# Patient Record
Sex: FEMALE | Race: ASIAN | NOT HISPANIC OR LATINO | Employment: PART TIME | URBAN - METROPOLITAN AREA
[De-identification: names, ages, dates, MRNs, and addresses within clinical notes are randomized per-mention and may not be internally consistent; named-entity substitution may affect disease eponyms.]

---
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
## 2021-01-05 RX ORDER — LORAZEPAM 1 MG/1
TABLET ORAL
Qty: 1 TABLET | Refills: 0 | Status: SHIPPED | OUTPATIENT
Start: 2021-01-05

## 2021-01-05 NOTE — TELEPHONE ENCOUNTER
Left message for pts mother relaying that medication has been sent as requested. Provided direct contact information should she have any questions or concerns. Stephanie SIU RN, BSN

## 2021-01-18 ENCOUNTER — ANCILLARY PROCEDURE (OUTPATIENT)
Dept: MRI IMAGING | Facility: CLINIC | Age: 21
End: 2021-01-18
Attending: PLASTIC SURGERY
Payer: COMMERCIAL

## 2021-01-18 DIAGNOSIS — Q51.9 CONGENITAL ANOMALY OF CERVIX, VAGINA, AND EXTERNAL FEMALE GENITALIA: ICD-10-CM

## 2021-01-18 DIAGNOSIS — N89.5 VAGINAL STENOSIS: ICD-10-CM

## 2021-01-18 DIAGNOSIS — Q52.9 CONGENITAL ANOMALY OF CERVIX, VAGINA, AND EXTERNAL FEMALE GENITALIA: ICD-10-CM

## 2021-01-18 DIAGNOSIS — Q52.4 CONGENITAL ANOMALY OF CERVIX, VAGINA, AND EXTERNAL FEMALE GENITALIA: ICD-10-CM

## 2021-01-18 PROCEDURE — 72195 MRI PELVIS W/O DYE: CPT | Mod: 52 | Performed by: RADIOLOGY

## 2021-01-18 RX ORDER — GADOBUTROL 604.72 MG/ML
7.5 INJECTION INTRAVENOUS ONCE
Status: ACTIVE | OUTPATIENT
Start: 2021-01-18

## 2021-03-29 ENCOUNTER — PATIENT OUTREACH (OUTPATIENT)
Dept: PLASTIC SURGERY | Facility: CLINIC | Age: 21
End: 2021-03-29

## 2021-03-29 NOTE — PATIENT INSTRUCTIONS
Left message for pt regarding follow up plan. Explained that new MRI is not needed. Based on results there is no urgency to the revision. We recommend continuing with smoking cessation and better diabetes control, goal of A1C less than 7. Explained that Dr. Shaw is leaving the practice, but that pt should follow up with Dr. Weldon once above is completed. Provided direct contact information should she have any questions or concerns. Stephanie SIU RN, BSN

## 2021-06-22 ENCOUNTER — HOSPITAL ENCOUNTER (INPATIENT)
Facility: CLINIC | Age: 21
LOS: 2 days | Discharge: HOME | DRG: 885 | End: 2021-06-25
Attending: EMERGENCY MEDICINE | Admitting: PSYCHIATRY & NEUROLOGY
Payer: COMMERCIAL

## 2021-06-22 DIAGNOSIS — F12.99: ICD-10-CM

## 2021-06-22 DIAGNOSIS — F31.9 BIPOLAR AFFECTIVE DISORDER, REMISSION STATUS UNSPECIFIED (H): ICD-10-CM

## 2021-06-22 DIAGNOSIS — F19.10 POLYSUBSTANCE ABUSE (H): ICD-10-CM

## 2021-06-22 DIAGNOSIS — Z20.822 CONTACT WITH AND (SUSPECTED) EXPOSURE TO COVID-19: ICD-10-CM

## 2021-06-22 DIAGNOSIS — R40.0 SOMNOLENCE: ICD-10-CM

## 2021-06-22 LAB — GLUCOSE BLDC GLUCOMTR-MCNC: 288 MG/DL (ref 70–99)

## 2021-06-22 PROCEDURE — 99285 EMERGENCY DEPT VISIT HI MDM: CPT | Mod: 25 | Performed by: EMERGENCY MEDICINE

## 2021-06-22 PROCEDURE — 99285 EMERGENCY DEPT VISIT HI MDM: CPT | Performed by: EMERGENCY MEDICINE

## 2021-06-22 PROCEDURE — 250N000013 HC RX MED GY IP 250 OP 250 PS 637: Performed by: EMERGENCY MEDICINE

## 2021-06-22 PROCEDURE — 250N000009 HC RX 250: Performed by: EMERGENCY MEDICINE

## 2021-06-22 PROCEDURE — 90791 PSYCH DIAGNOSTIC EVALUATION: CPT

## 2021-06-22 PROCEDURE — 999N001017 HC STATISTIC GLUCOSE BY METER IP

## 2021-06-22 RX ORDER — OLANZAPINE 10 MG/1
10 TABLET, ORALLY DISINTEGRATING ORAL
Status: DISCONTINUED | OUTPATIENT
Start: 2021-06-22 | End: 2021-06-23

## 2021-06-22 RX ORDER — LIRAGLUTIDE 6 MG/ML
1.2 INJECTION SUBCUTANEOUS AT BEDTIME
Status: DISCONTINUED | OUTPATIENT
Start: 2021-06-22 | End: 2021-06-23

## 2021-06-22 RX ADMIN — INSULIN DEGLUDEC INJECTION 25 UNITS: 100 INJECTION, SOLUTION SUBCUTANEOUS at 23:58

## 2021-06-22 RX ADMIN — LIRAGLUTIDE 1.2 MG: 6 INJECTION SUBCUTANEOUS at 23:58

## 2021-06-23 PROBLEM — R40.0 SOMNOLENCE: Status: ACTIVE | Noted: 2021-06-23

## 2021-06-23 PROBLEM — F19.10 POLYSUBSTANCE ABUSE (H): Status: ACTIVE | Noted: 2021-06-23

## 2021-06-23 LAB
AMPHETAMINES UR QL SCN: NEGATIVE
BARBITURATES UR QL: NEGATIVE
BENZODIAZ UR QL: NEGATIVE
CANNABINOIDS UR QL SCN: NEGATIVE
COCAINE UR QL: NEGATIVE
ETHANOL UR QL SCN: NEGATIVE
GLUCOSE BLDC GLUCOMTR-MCNC: 212 MG/DL (ref 70–99)
GLUCOSE BLDC GLUCOMTR-MCNC: 226 MG/DL (ref 70–99)
GLUCOSE BLDC GLUCOMTR-MCNC: 268 MG/DL (ref 70–99)
GLUCOSE BLDC GLUCOMTR-MCNC: 271 MG/DL (ref 70–99)
GLUCOSE BLDC GLUCOMTR-MCNC: 306 MG/DL (ref 70–99)
HBA1C MFR BLD: 8.3 % (ref 0–5.6)
HCG UR QL: NEGATIVE
LABORATORY COMMENT REPORT: NORMAL
OPIATES UR QL SCN: NEGATIVE
SARS-COV-2 RNA RESP QL NAA+PROBE: NEGATIVE
SPECIMEN SOURCE: NORMAL

## 2021-06-23 PROCEDURE — 124N000002 HC R&B MH UMMC

## 2021-06-23 PROCEDURE — 250N000013 HC RX MED GY IP 250 OP 250 PS 637: Performed by: STUDENT IN AN ORGANIZED HEALTH CARE EDUCATION/TRAINING PROGRAM

## 2021-06-23 PROCEDURE — 93010 ELECTROCARDIOGRAM REPORT: CPT | Performed by: INTERNAL MEDICINE

## 2021-06-23 PROCEDURE — 99221 1ST HOSP IP/OBS SF/LOW 40: CPT | Performed by: PHYSICIAN ASSISTANT

## 2021-06-23 PROCEDURE — 250N000009 HC RX 250: Performed by: STUDENT IN AN ORGANIZED HEALTH CARE EDUCATION/TRAINING PROGRAM

## 2021-06-23 PROCEDURE — 99207 PR CONSULT E&M CHANGED TO INITIAL LEVEL: CPT | Performed by: PHYSICIAN ASSISTANT

## 2021-06-23 PROCEDURE — 93005 ELECTROCARDIOGRAM TRACING: CPT

## 2021-06-23 PROCEDURE — 80307 DRUG TEST PRSMV CHEM ANLYZR: CPT | Performed by: EMERGENCY MEDICINE

## 2021-06-23 PROCEDURE — 999N001017 HC STATISTIC GLUCOSE BY METER IP

## 2021-06-23 PROCEDURE — 87635 SARS-COV-2 COVID-19 AMP PRB: CPT | Performed by: EMERGENCY MEDICINE

## 2021-06-23 PROCEDURE — 250N000012 HC RX MED GY IP 250 OP 636 PS 637: Performed by: STUDENT IN AN ORGANIZED HEALTH CARE EDUCATION/TRAINING PROGRAM

## 2021-06-23 PROCEDURE — 80320 DRUG SCREEN QUANTALCOHOLS: CPT | Performed by: EMERGENCY MEDICINE

## 2021-06-23 PROCEDURE — 83036 HEMOGLOBIN GLYCOSYLATED A1C: CPT | Performed by: STUDENT IN AN ORGANIZED HEALTH CARE EDUCATION/TRAINING PROGRAM

## 2021-06-23 PROCEDURE — 36415 COLL VENOUS BLD VENIPUNCTURE: CPT | Performed by: STUDENT IN AN ORGANIZED HEALTH CARE EDUCATION/TRAINING PROGRAM

## 2021-06-23 PROCEDURE — 99223 1ST HOSP IP/OBS HIGH 75: CPT | Mod: AI | Performed by: PSYCHIATRY & NEUROLOGY

## 2021-06-23 PROCEDURE — 81025 URINE PREGNANCY TEST: CPT | Performed by: EMERGENCY MEDICINE

## 2021-06-23 RX ORDER — POLYETHYLENE GLYCOL 3350 17 G/17G
17 POWDER, FOR SOLUTION ORAL DAILY PRN
Status: DISCONTINUED | OUTPATIENT
Start: 2021-06-23 | End: 2021-06-25 | Stop reason: HOSPADM

## 2021-06-23 RX ORDER — NICOTINE POLACRILEX 4 MG
15-30 LOZENGE BUCCAL
Status: DISCONTINUED | OUTPATIENT
Start: 2021-06-23 | End: 2021-06-25 | Stop reason: HOSPADM

## 2021-06-23 RX ORDER — OLANZAPINE 10 MG/2ML
10 INJECTION, POWDER, FOR SOLUTION INTRAMUSCULAR 3 TIMES DAILY PRN
Status: DISCONTINUED | OUTPATIENT
Start: 2021-06-23 | End: 2021-06-25 | Stop reason: HOSPADM

## 2021-06-23 RX ORDER — MAGNESIUM HYDROXIDE/ALUMINUM HYDROXICE/SIMETHICONE 120; 1200; 1200 MG/30ML; MG/30ML; MG/30ML
30 SUSPENSION ORAL EVERY 4 HOURS PRN
Status: DISCONTINUED | OUTPATIENT
Start: 2021-06-23 | End: 2021-06-25 | Stop reason: HOSPADM

## 2021-06-23 RX ORDER — ATOMOXETINE 40 MG/1
40 CAPSULE ORAL DAILY
Status: DISCONTINUED | OUTPATIENT
Start: 2021-06-23 | End: 2021-06-25 | Stop reason: HOSPADM

## 2021-06-23 RX ORDER — LIRAGLUTIDE 6 MG/ML
1.2 INJECTION SUBCUTANEOUS DAILY
Status: DISCONTINUED | OUTPATIENT
Start: 2021-06-23 | End: 2021-06-25 | Stop reason: HOSPADM

## 2021-06-23 RX ORDER — LISINOPRIL 20 MG/1
20 TABLET ORAL DAILY
Status: DISCONTINUED | OUTPATIENT
Start: 2021-06-23 | End: 2021-06-25 | Stop reason: HOSPADM

## 2021-06-23 RX ORDER — GABAPENTIN 100 MG/1
200 CAPSULE ORAL 2 TIMES DAILY PRN
COMMUNITY
Start: 2021-06-17

## 2021-06-23 RX ORDER — ESTRADIOL 2 MG/1
4 TABLET ORAL DAILY
Status: DISCONTINUED | OUTPATIENT
Start: 2021-06-23 | End: 2021-06-25 | Stop reason: HOSPADM

## 2021-06-23 RX ORDER — ACETAMINOPHEN 325 MG/1
650 TABLET ORAL EVERY 4 HOURS PRN
Status: DISCONTINUED | OUTPATIENT
Start: 2021-06-23 | End: 2021-06-25 | Stop reason: HOSPADM

## 2021-06-23 RX ORDER — ATOMOXETINE 40 MG/1
40 CAPSULE ORAL DAILY PRN
COMMUNITY
Start: 2021-06-15

## 2021-06-23 RX ORDER — OLANZAPINE 10 MG/1
10 TABLET ORAL EVERY EVENING
COMMUNITY
Start: 2021-06-07

## 2021-06-23 RX ORDER — LANOLIN ALCOHOL/MO/W.PET/CERES
3 CREAM (GRAM) TOPICAL
Status: DISCONTINUED | OUTPATIENT
Start: 2021-06-23 | End: 2021-06-25 | Stop reason: HOSPADM

## 2021-06-23 RX ORDER — OLANZAPINE 10 MG/1
10 TABLET ORAL EVERY EVENING
Status: DISCONTINUED | OUTPATIENT
Start: 2021-06-23 | End: 2021-06-25 | Stop reason: HOSPADM

## 2021-06-23 RX ORDER — OLANZAPINE 10 MG/1
10 TABLET ORAL 3 TIMES DAILY PRN
Status: DISCONTINUED | OUTPATIENT
Start: 2021-06-23 | End: 2021-06-25 | Stop reason: HOSPADM

## 2021-06-23 RX ORDER — DEXTROSE MONOHYDRATE 25 G/50ML
25-50 INJECTION, SOLUTION INTRAVENOUS
Status: DISCONTINUED | OUTPATIENT
Start: 2021-06-23 | End: 2021-06-25 | Stop reason: HOSPADM

## 2021-06-23 RX ORDER — GABAPENTIN 100 MG/1
200 CAPSULE ORAL 2 TIMES DAILY PRN
Status: DISCONTINUED | OUTPATIENT
Start: 2021-06-23 | End: 2021-06-25 | Stop reason: HOSPADM

## 2021-06-23 RX ORDER — ERGOCALCIFEROL 1.25 MG/1
50000 CAPSULE, LIQUID FILLED ORAL DAILY
Status: DISCONTINUED | OUTPATIENT
Start: 2021-06-23 | End: 2021-06-25 | Stop reason: HOSPADM

## 2021-06-23 RX ADMIN — LIRAGLUTIDE 1.2 MG: 6 INJECTION SUBCUTANEOUS at 09:06

## 2021-06-23 RX ADMIN — OLANZAPINE 10 MG: 10 TABLET, FILM COATED ORAL at 20:24

## 2021-06-23 RX ADMIN — ESTRADIOL 4 MG: 2 TABLET ORAL at 09:04

## 2021-06-23 RX ADMIN — INSULIN DEGLUDEC INJECTION 27 UNITS: 100 INJECTION, SOLUTION SUBCUTANEOUS at 20:42

## 2021-06-23 RX ADMIN — INSULIN ASPART 2 UNITS: 100 INJECTION, SOLUTION INTRAVENOUS; SUBCUTANEOUS at 02:39

## 2021-06-23 RX ADMIN — LISINOPRIL 20 MG: 20 TABLET ORAL at 09:05

## 2021-06-23 RX ADMIN — NICOTINE 7 MG/24 HR DAILY TRANSDERMAL PATCH 1 PATCH: at 13:55

## 2021-06-23 RX ADMIN — MELATONIN TAB 3 MG 3 MG: 3 TAB at 20:24

## 2021-06-23 RX ADMIN — INSULIN ASPART 2 UNITS: 100 INJECTION, SOLUTION INTRAVENOUS; SUBCUTANEOUS at 09:05

## 2021-06-23 RX ADMIN — ATOMOXETINE 40 MG: 40 CAPSULE ORAL at 09:03

## 2021-06-23 RX ADMIN — NICOTINE POLACRILEX 4 MG: 2 GUM, CHEWING BUCCAL at 18:45

## 2021-06-23 RX ADMIN — FLUOXETINE 40 MG: 20 CAPSULE ORAL at 09:04

## 2021-06-23 RX ADMIN — ERGOCALCIFEROL 50000 UNITS: 1.25 CAPSULE, LIQUID FILLED ORAL at 09:04

## 2021-06-23 ASSESSMENT — MIFFLIN-ST. JEOR: SCORE: 1557.41

## 2021-06-23 ASSESSMENT — ACTIVITIES OF DAILY LIVING (ADL)
LAUNDRY: UNABLE TO COMPLETE
DRESS: SCRUBS (BEHAVIORAL HEALTH)
ORAL_HYGIENE: INDEPENDENT
HYGIENE/GROOMING: INDEPENDENT

## 2021-06-23 NOTE — PLAN OF CARE
BEHAVIORAL TEAM DISCUSSION    Participants: Dr. Gonzalez, Psychiatry Resident, Melania IBRAHIM, Kindra Laboy [facility]  Progress: New Patient  Anticipated length of stay: No anticipated discharge date at this time.   Continued Stay Criteria/Rationale: Patient is manic. Needs clinical stabilization and medication management.   Medical/Physical: See H&P note  Precautions:   Behavioral Orders   Procedures     Code 1 - Restrict to Unit     Routine Programming     As clinically indicated     Status 15     Every 15 minutes.     Plan: Patient will have ongoing psychiatric assessment. Medications will be reviewed and adjusted per MD as indicated. Outpatient providers will be contacted for care coordination. CTC will continue to assess needs and  ensure appropriate follow up care is in place.  Rationale for change in precautions or plan: None

## 2021-06-23 NOTE — PROGRESS NOTES
Yamilka Tierney Read  June 23, 2021    The patient is brought to the ER by her mother for continued acute merissa and psychosis symptoms. The patient is delusional and believes she can taste colors, the neighbor's car is calling her, and that she should move to [place]. She needs redirection for basic tasks like eating, tending to her blood sugar, brushing her teeth and showering. The patient is not fully oriented and becomes minimally functional only when her mother administers her Zyprexa.     Current Suicidal Ideation/Self-Injurious Concerns/Methods: None    Inappropriate Sexual Behavior: No    Aggression/Homicidal Ideation: None - N/A    For additional details see full DEC assessment.     Mireya Donaldson, LICSW

## 2021-06-23 NOTE — H&P
"    ----------------------------------------------------------------------------------------------------------  [facility]  History and Physical  Yamilka Franklin MRN# [id number]   Age: 21 year old YOB: 2000   Date of Admission:  6/22/2021  (information obtained from patient interview and chart review)    Contacts:   Primary Outpatient Psychiatrist: Dr, Johnson, [facility], 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     Per ED Note:  Yamilka Franklin is a 21 year old female who presents to the emergency department for psychiatric evaluation. The history is obtained from the medical record and the patient's mother, Mandy.  Mackenzie is uanble to provide additional history as she is sedated with Zyprexa.   Mackenzie was recently  hospitalized at [facility] from 5/31-6/7/2021 for a suspected  substance-induced mood disorder.  She reported using K2 and marijuana and was experiencing auditory hallucinations and hyperexcitability.  At that time she was agitated and had to be placed on a 72-hour hold and treated with Zyprexa for aggitaiton.   She was discharged home with a supply of her medications, including Zyprexa which she was initiated while inpatient.   Her Adderall and Restoril were discontinued.  They noted that a substantial portion of her symptoms seem to be substance induced.  Her mother states that since returning home, " "she has had continued symptoms of agitation, irregular sleep, and delusional thinking and responding to internal stimuli.  Her mother states that she used marijuana and possibly K2 after returning home. She will often make delusional statements and has been found confused on the streets.  Her mother is concerned for her safety. \"      Per DEC Assessment:  \" The patient is a 21-year-old  female brought to the ER by her mother for continued acute merissa and psychosis symptoms.  Historically the patient has been high functioning, living independently and working at a job at [employer].  In the past year she has been using marijuana for the first time which her mother Tricia ([phone]) believes triggered her first episode of merissa and psychosis.  The patient was adopted at the age of [age] from an orphanage in [facility] so she has some history of trauma and attachment issues.  Patient has type 2 diabetes, it is notable for hospital staff to be aware \"she is really disorganized around food but gets really anxious about food.  She should not have carbs restricted because she can manage it with insulin and when she gets fearful or hungry she gets extremely upset.\"  Mandy states that she brought her daughter to the hospital today because she is so disorganized, no longer is managing her diabetes, eating regularly or taking showers, and has tried to go to work 3 times this week before coming home prematurely due to her any inability to function.     The patient presented somewhat lethargic during the assessment but Mandy states that because she was recently given her Zyprexa and \"she has been antsy all day.\"  The patient states \"I think I would be seeing stuff that only I can see sometimes.  It is like my brain is foggy but I remember bits and pieces that are floating in my mind.  Sometimes I feel like I can hear colors and feel the colors like a mood ring.  I think I might hurt somebody but not on purpose, like accidentally, " "he had just incidentally I might say something or do something that might be disrespectful or rude.\"  The patient's mother states that Mackenzie is delusional and believes \"the [employer] car is calling\"  her and that she should moved to [place].  She says she might be a dog or an alien.  A month ago she went missing for 12 hours because she just goes walking in circles.  Mandy states that she needs redirection for basic tasks like eating, tending to her blood sugar, brushing her teeth and showering.  The patient is not fully oriented and becomes minimally functional only when her mother administers her Zyprexa.  The patient was recently hospitalized at [facility] for 6 days but had a resurgence of symptoms again shortly after.  She has history of ADHD, VALERIE, DMDD and unspecified mood disorder and possibly \" bipolar\" according to the mother.  Mother adds \"my son has an eating disorder and the amount of time and attention he was getting was upsetting to her.  So she started using weed.\"  She states that her daughter was previously taking ADHD medication but then began selling it so it was taken away.  During the assessment the patient states \"I had sex for the first time, it was anal, it was consensual, it was with Martin, he has this mena who I was talking for a long time.  It was very consensual.  He is a bit older than me but I do not mind.\"  She is agreeable to voluntary admission to the hospital.\"    Per Patient Interview/Colletral from Mandy (Mom):  Mackenzie was interviewed in her room. She stated that her mom was concerned about her symptoms of \"merissa\" and called the EMS. \"My mom thinks I have bipolar disorder, I think I have it too.\" She had slept only an hour or so three nights on a row past week and \"my mind did not feel tired but my body did the next morning.\" She disclosed that she has been using cannabis, K2 at least three times a week, and last use was last Thursday. She has been struggling with depression since the age of 15 " and have been on fluoxetine and Cymbalta for a long time. Her Cymbalta dose was increased last year during the pandemic when her depression gotten worse; she had difficulty to concentrate and follow through her online classes and felt isolated. Spoke with mom on the phone. She stated that Mackenzie has been irritable, restless, she has been changing her clothes repetitively, walking in circles at home and engaging in high risk sexual behavior. Mackenzie was hospitalized at [facility] at the end May for similar sx and was discharged with olanzapine 10mg at bed time.     She was medically cleared for admission to inpatient psychiatric unit. The risks, benefits, alternatives, and side effects of treatments including no treatment have been discussed and are understood by the patient and other caregivers.    Psychiatric ROS:  Depression: poor concentration /memory, hx of depressive episodes  Lissette/Hypomania:  increased energy, decreased sleep need, increased activity, grandiosity, distractibility  and excessive risk taking  Anxiety: None reported  Panic Attack: None reported  Psychosis: auditory hallucinations, visual hallucinations and disorganized behavior on admission, not currently  Trauma Related: Adopted from an orphanage in [place] at age [age], hx of trauma and attachment issues, per chart  Personality Symptoms: fear of abandonment/rejection and impulsivity  Obsessive Compulsive Disorder: Walking around the cricle    DMDD: None and Poor frustration tolerance, mostly around food, gets upset when carbs are restricted.   Eating Disorders: None reported  Oppositional Defiant Disorder/ conduct: None reported  ADHD: easily distracted, difficulty organizing tasks or activities, difficulty sustaining attention, does not follow through on instructions and fails to finish schoolwork, chores, etc., often forgetful in daily activities, fidgets with hands or feet or squirms in his seat and sense of restlessness  LD: No previously diagnosed or  "signs of symptoms of learning disorder reported.  ASD: none  RAD: None reported.  Suicidal Ideation: Not currently present.   Homicidal Ideation: Per chart \"I think I might hurt somebody but not on purpose, like accidentally.\"      Medical ROS: A complete medical review of systems was preformed and is negative other than noted in the HPI     Psychiatric History:   Prior diagnoses: ADHD, mood disorder, depression  Prior Hospitalizations: [facility] 05/27/2021 for 6 days  Suicide attempts: Hx of overdose on benadryl in ~ 2017  Self-injurious behavior: None reported  Violence: None reported.   ECT/TMS: No  Past medications: Adderall, fluoxetine, duloxatine     Substance Use History:   Nicotine: Current every day smoker, Vapes daily, Smokes 1-2 cigarettes a day.  Alcohol: No      Cannabis, K2 use three times a week.   Denies current or past addiction to stimulants, opiates, benzodiazepines, hallucinogens, or other elicit substances.   Prior CD treatments: None, interested.      Social History:   Early History: Adopted at age 4 form [facility]  Educational History: Some college, wants to go back to school this fall.  Occupation: Works at [employer]  Current Living Situation: Lives with parents, and a younger brother  Abuse/Trauma: Hx of trauma at the orphanage   Legal: Lost her license while driving high     Medical History:   History reviewed. No pertinent past medical history.   Surgical History:     Past Surgical History:   Procedure Laterality Date     LAPAROSCOPY DIAGNOSTIC CHILD  11/8/2013    Procedure: LAPAROSCOPY DIAGNOSTIC CHILD;  Diagnostic Laparoscopy, Bilateral Gonadectomy ;  Surgeon: Leni Lubin MD;  Location: [facility] OR     No History of seizures or head trauma.   Family History:   Not know. Adopted.     Allergies:   No Known Allergies   Medications:     Prior to Admission Medications   Prescriptions Last Dose Informant Patient Reported? Taking?   DULoxetine (CYMBALTA) 30 MG capsule   Yes No   Sig: Take 30 mg by mouth "   FLUoxetine (PROZAC) 40 MG capsule   Yes No   Sig: Take 40 mg by mouth   GLUCAGON EMERGENCY 1 MG kit   Yes No   Sig: INJECT 1 MG SUBCUTANEOUSLY ONCE FOR 1 DOSE   LORazepam (ATIVAN) 1 MG tablet   No No   Sig: Take 1 tablet PO 2 hours prior to MRI   OLANZapine (ZYPREXA) 10 MG tablet   Yes Yes   Sig: Take 10 mg by mouth every evening   acetaminophen-codeine 300-30 MG per tablet   No No   Sig: Take 1-2 tablets by mouth every 4 hours as needed for pain   amphetamine-dextroamphetamine (ADDERALL XR) 30 MG 24 hr capsule   Yes No   Sig: Take 30 mg by mouth   atomoxetine (STRATTERA) 40 MG capsule   Yes No   Sig: Take 40 mg by mouth daily   clobetasol (TEMOVATE) 0.05 % external ointment   Yes No   Sig: CARLOS BID   ergocalciferol (ERGOCALCIFEROL) 1.25 MG (27797 UT) capsule   Yes No   Sig: Take 50,000 Units by mouth   estradiol (ESTRACE) 2 MG tablet   Yes No   Sig: Take 4 mg by mouth   gabapentin (NEURONTIN) 100 MG capsule   Yes No   Sig: Take 200 mg by mouth 2 times daily as needed for anxiety   insulin aspart (NOVOLOG FLEXPEN) 100 UNIT/ML pen   Yes No   Sig: Inject 150 Units Subcutaneous   insulin degludec (TRESIBA FLEXTOUCH) 100 UNIT/ML pen   Yes No   Sig: Inject 26-30 Units Subcutaneous   liraglutide (VICTOZA PEN) 18 MG/3ML solution   Yes No   Sig: Inject 1.2 mg Subcutaneous   lisinopril (ZESTRIL) 20 MG tablet   Yes No   Sig: Take 20 mg by mouth      Facility-Administered Medications: None      No current outpatient medications on file.        Data (Labs/Imaging):   Data   Recent Results (from the past 24 hour(s))   Glucose by meter    Collection Time: 06/22/21  9:35 PM   Result Value Ref Range    Glucose 288 (H) 70 - 99 mg/dL   Asymptomatic SARS-CoV-2 COVID-19 Virus (Coronavirus) by PCR    Collection Time: 06/23/21 12:04 AM    Specimen: Nasopharyngeal   Result Value Ref Range    SARS-CoV-2 Virus Specimen Source Nasopharyngeal     SARS-CoV-2 PCR Result NEGATIVE     SARS-CoV-2 PCR Comment (Note)    HCG qualitative urine (UPT)  "   Collection Time: 06/23/21 12:46 AM   Result Value Ref Range    HCG Qual Urine Negative NEG^Negative   Drug abuse screen 6 urine (chem dep)    Collection Time: 06/23/21 12:46 AM   Result Value Ref Range    Amphetamine Qual Urine Negative NEG^Negative    Barbiturates Qual Urine Negative NEG^Negative    Benzodiazepine Qual Urine Negative NEG^Negative    Cannabinoids Qual Urine Negative NEG^Negative    Cocaine Qual Urine Negative NEG^Negative    Ethanol Qual Urine Negative NEG^Negative    Opiates Qualitative Urine Negative NEG^Negative   Glucose by meter    Collection Time: 06/23/21  2:11 AM   Result Value Ref Range    Glucose 306 (H) 70 - 99 mg/dL     Pending Results      Physical & Psychiatric Examinations:   /83   Pulse 70   Temp 98.7  F (37.1  C) (Oral)   Resp 16   Ht 1.676 m (5' 6\")   Wt 77.1 kg (169 lb 14.4 oz)   SpO2 100%   BMI 27.42 kg/m    See ED assessment note by ED physician on 06/23/2021     Appearance:  awake, alert, dressed in hospital scrubs and appeared as age stated  Muscle Strength and Tone: normal  Gait and Station: Normal  Behavior (Psychomotor):  no evidence of tardive dyskinesia, dystonia, or tics and fidgeting  Eye Contact:  fair  Speech:  clear, coherent and normal prosody  Mood:  Elevated  Affect:  mood congruent  Attitude:  cooperative  Thought Process:  linear and goal oriented, slightly disorganized   Thought Content:  no evidence of suicidal ideation or homicidal ideation, no evidence of psychotic thought, no auditory hallucinations present and no visual hallucinations present  Associations:  no loose associations  Insight:  limited  Judgment:  limited  Oriented to:  time, person, and place  Attention Span and Concentration: poor, distracted  Recent and Remote Memory:  fair  Language: Fluent in English with appropriate syntax and vocabulary.  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 Significant symptoms include diminished need for sleep,  mood lability, sleep issues, disorganization and substance use.     Her last psychiatric hospitalization was in 05/31/2021 at [facility] for psychosis in the context of substance use. Current psychosocial stressors include school issues and family dynamics (patient has a brother with eating disorder and mom's focus on him seems to be difficult to accept for her) which she has been coping with by using substances, acting out to self and running (patient was missing for 12 hours recently).  Patient's support system includes family.  Substance use does appear to be playing a contributing role in the patient's presentation (cannabis, K2 use). The genetic loading is unknown, patient was adopted. 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  She denies SI or HI. Her reported symptoms of diminished need for sleep, disorganized thoughts, hyperactivity, delusional thinking, risky sexual behavior, hearing and seeing things are consistent with diagnoses of substance-induced psychosis and merissa vs primary mood disorder, possibly bipolar disorder. Patient has a history of depression since age 15, however has never had merissa symptoms before.  At this point, it is unknown if this is first episode of merissa following several years of depression in the context of bipolar disorder, or substance-induced mood disorder.  Per chart review, she was hospitalized at [facility] for mental health two weeks ago where she was started on Zyprexa but was thought to have a substance induced mood and psychotic  disorder.    Optimization of medications to target these symptom clusters in addition to evaluation of adquate outpatient supports will be targets for treatment during this admission.     Patient warrants inpatient psychiatric hospitalization to maintain her safety. Disposition pending clinical stabilization, medication optimization and development of an appropriate discharge plan.    Risk for harm is moderate.  Risk factors: maladaptive coping, substance use and impulsive  Protective factors: family and engaged in treatment     Psychiatric diagnoses:   # Unspecified mood disorder         Bipolar disorder vs substance-induced mood disorder  # Unspecified psychosis   -Olanzapine  10mg at bedtime     # ADHD  -atomoxetine (STRATTERA) capsule 40 mg       - Admit to [facility] with Attending Physician Jim Gonzalez and will be treated in the therapeutic milieu with appropriate individual and group therapies.     - Medications:   -Prn medications: acetaminophen, alum & mag hydroxide-simethicone, glucose **OR** dextrose **OR** glucagon, gabapentin, melatonin, nicotine polacrilex, OLANZapine **OR** OLANZapine, polyethylene glycol     Discontinued Medications at This Admission:  - DULoxetine (CYMBALTA) DR capsule 90 mg     - FLUoxetine (PROzac) capsule 40 mg       Medical Diagnoses:    # Diabetes Mellitus, Type 2  Per Medicine consult on 6/23.  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 Lepe at [facility]. Chromosomes are 46 XY, no mosaicism. No evidence of testosterone production on hCG and ACTH stim tests.  Hx of exploratory laparotomy in 2013, with removal of streak gonads. Both Wolffian and Mullerian structures were identified. Previous MRI had not shown presence of uterus, vagina or gonads. This was confirmed at laparotomy with the exception of what is noted above.   See Dr. Reyez's note of 1/29/16 for details. She then had onset of HRT as noted above and vaginoplasty this past summer. Identifies as female gender identity.  - Estradiol PO 4mg daily     - Consult: None  - Labs: CMP, CBC, TSH, B12.     Legal Status: Voluntary    Disposition: TBD, pending clinical stabilization, medication optimization, and formulation of a safe discharge plan.     Safety Assessment:   Behavioral Orders   Procedures     Code 1 - Restrict to Unit     Routine Programming     As clinically indicated     Status 15     Every 15 minutes.        Patient seen and discussed with my attending physician, Dr. Jim Gonzalez who is in agreement with my assessment and plan.    Jaime Alonzo MD  PGY-1 Psychiatry Resident    Attending Attestation:  I, Jim Gonzalez , have personally performed an examination of this patient and I have reviewed the resident's documentation.  I have edited the note to reflect all relevant changes.  I have discussed this patient with the house staff on 6/23/2021.  I agree with resident findings and plan in today's note and yesterdays resident H&P.  I have reviewed all vitals and laboratory findings.      Jim Rivers MD on 6/30/2021 at 3:03 PM

## 2021-06-23 NOTE — PLAN OF CARE
"  Problem: Behavioral Health Plan of Care  Goal: Patient-Specific Goal (Individualization)  Flowsheets (Taken 6/23/2021 2104)  Patient Vulnerabilities:   adverse childhood experience(s)   lacks insight into illness   substance abuse/addiction   history of unsuccessful treatment   poor impulse control   poor physical health  Patient Personal Strengths:   expressive of emotions   expressive of needs   family/social support   positive vocational history   stable living environment  Note:   Personal Plan of Care    Patient goals:  \"I want to feel stable (less manic)\"  \"I want my medications to be adjusted\"  \"I want to know if I'm bipolar\"    Plan for admission:  1. stabilization of mood disorder symptoms.   2. Absence of SI/Safe with self  3. Medication management per Mds  4. Coordination of Care with outpatient providers/family  5. Psychiatric follow up care in place       "

## 2021-06-23 NOTE — CONSULTS
"[facility]  Consult Note - Hospitalist Service     Date of Admission:  6/22/2021  Consult Requested by: Dr. Alonzo  Reason for Consult: Diabetes management    Assessment & Plan   Yamilka Franklin is a 21 year old female with a history of DM, anxiety, ADHD, who was admitted to inpatient behavioral health with manic symptoms.    Insulin-dependent diabetes mellitus type 1: Follows with [facility] Endocrinology, per their last note on 5/18/21: \"Although we have labeled her diagnosis as Type 1 because she is insulin dependent and diagnosed in childhood, her antibodies at the time of diagnosis were negative.\" Patient reports good compliance with home regimen. Appetite currently increased from baseline. BG in 300s currently. Home insulin regimen verified with patient and also with OP Endocrine notes.    - Will continue insulin per her home regimen as follows:   - Tresiba 27 units at bedtime   - Novolog carb coverage: 1 unit per every 2 grams CHO with meals and snacks   - Novolog sliding scale: 3 units for every 50 >150   - Victoza 1.2 mg at bedtime   - BG checks TID ac, at bedtime, 0200   - Hypoglycemia protocol    Medicine will follow blood glucose peripherally until stable. Please do not hesitate to contact if new questions or concerns arise.     Romelia Cardoza PA-C  [facility]  Securely message with the Vocera Web Console (learn more here)  Text page via Aqdot Paging/Directory      Risk Factors Present on Admission                   ______________________________________________________________________    Chief Complaint   Lissette    History is obtained from the patient    History of Present Illness   Yamilka Franklin is a 21 year old female with a history of DM, anxiety, ADHD, who was admitted to inpatient behavioral health with manic symptoms. Medicine consulted for diabetes management. She reports being diagnosed " "with diabetes in childhood. She tells me that she is \"in between\" type 1 and type 2 diabetes. She follows closely with Endocrinology at [facility] and just had her Tresiba dose increased at last visit due to rising A1C. She reports good medication compliance. Currently she is eating more here than she does at home. She is concerned that she has bipolar disorder and wishes to speak with a Psychiatrist about this. She has no other medical concerns today.     Review of Systems   The 10 point Review of Systems is negative other than noted in the HPI or here.     Past Medical History    I have reviewed this patient's medical history and updated it with pertinent information if needed.   History reviewed. No pertinent past medical history.    Past Surgical History   I have reviewed this patient's surgical history and updated it with pertinent information if needed.  Past Surgical History:   Procedure Laterality Date     LAPAROSCOPY DIAGNOSTIC CHILD  11/8/2013    Procedure: LAPAROSCOPY DIAGNOSTIC CHILD;  Diagnostic Laparoscopy, Bilateral Gonadectomy ;  Surgeon: Leni Lubin MD;  Location:  OR       Social History   I have reviewed this patient's social history and updated it with pertinent information if needed.  Social History     Tobacco Use     Smoking status: Current Every Day Smoker     Types: Cigarettes     Smokeless tobacco: Never Used     Tobacco comment: Vapes daily, Smokes 1-2 cigarettes a day.   Substance Use Topics     Alcohol use: No     Drug use: Yes     Types: Marijuana       Family History     No significant family history    Medications   Medications Prior to Admission   Medication Sig Dispense Refill Last Dose     OLANZapine (ZYPREXA) 10 MG tablet Take 10 mg by mouth every evening        acetaminophen-codeine 300-30 MG per tablet Take 1-2 tablets by mouth every 4 hours as needed for pain 20 tablet 0      amphetamine-dextroamphetamine (ADDERALL XR) 30 MG 24 hr capsule Take 30 mg by mouth        " atomoxetine (STRATTERA) 40 MG capsule Take 40 mg by mouth daily        clobetasol (TEMOVATE) 0.05 % external ointment CARLOS BID        DULoxetine (CYMBALTA) 30 MG capsule Take 30 mg by mouth        ergocalciferol (ERGOCALCIFEROL) 1.25 MG (38006 UT) capsule Take 50,000 Units by mouth        estradiol (ESTRACE) 2 MG tablet Take 4 mg by mouth        FLUoxetine (PROZAC) 40 MG capsule Take 40 mg by mouth        gabapentin (NEURONTIN) 100 MG capsule Take 200 mg by mouth 2 times daily as needed for anxiety        GLUCAGON EMERGENCY 1 MG kit INJECT 1 MG SUBCUTANEOUSLY ONCE FOR 1 DOSE        insulin aspart (NOVOLOG FLEXPEN) 100 UNIT/ML pen Inject 150 Units Subcutaneous        insulin degludec (TRESIBA FLEXTOUCH) 100 UNIT/ML pen Inject 26-30 Units Subcutaneous        liraglutide (VICTOZA PEN) 18 MG/3ML solution Inject 1.2 mg Subcutaneous        lisinopril (ZESTRIL) 20 MG tablet Take 20 mg by mouth        LORazepam (ATIVAN) 1 MG tablet Take 1 tablet PO 2 hours prior to MRI 1 tablet 0        Allergies   No Known Allergies    Physical Exam   Vital Signs: Temp: 98.7  F (37.1  C) Temp src: Oral BP: 123/83 Pulse: 70   Resp: 16 SpO2: 100 % O2 Device: None (Room air)    Weight: 169 lbs 14.4 oz    Constitutional: Awake and alert, in no apparent distress.   Eyes: Sclera clear, anicteric   Respiratory: Breathing non-labored. CTAB.  Cardiovascular:  RRR, normal S1/S2. No rubs or murmurs.   GI: Non-distended.   Skin:  Good color. No jaundice. No visible rashes, lesions, or bruising of concern.   Neurologic: Alert and oriented x3. No focal deficits.       Data   ROUTINE IP LABS (Last four results)  No lab results found in last 7 days.  No lab results found in last 7 days.  No lab results found in last 7 days.     Glucose Values Latest Ref Rng & Units 6/22/2021 6/23/2021 6/23/2021   Bedside Glucose (mg/dl )  - -- -- --   GLUCOSE 70 - 99 mg/dL 288(H) 306(H) 268(H)   Some recent data might be hidden

## 2021-06-23 NOTE — PLAN OF CARE
Mackenzie is a 20 y/o female who was admitted to [facility] from [facility] ED due to manic behavior in the context of an overall decline in her functioning and inability to care for herself due to her increasing disorganization, paranoia, and delusional thinking. [place] [facility], for substance-induced mood disorder. She has a history of frequent abuse of marijuana and K2, although UTOX tonight was negative for all substances.     Pt has Type 1 Diabetes with BG checks QID (see orders for insulin dosing/types). Covid negative.    Upon arrival to the unit, pt was calm, cooperative, and polite in her interactions with nursing staff. However, she was noted to be disheveled in appearance with some disorganized speech/thought and possibly attending to internal stimuli. The admission interview was completed, but pt was difficult to full assess because she was at times disorganized, tangential and notably drowsy. Her BG was checked around 0215, she was given some snacks and given 2 units of insulin for BG of 306.    Problem: Behavioral Health Plan of Care  Goal: Plan of Care Review  Flowsheets (Taken 6/23/2021 0341)  Plan of Care Reviewed With: patient  Progress: no change  Patient Agreement with Plan of Care: unable to participate

## 2021-06-23 NOTE — ED NOTES
ED to Behavioral Floor Handoff    SITUATION  Yamilka Franklin is a 21 year old female who speaks English and lives in a home with family members The patient arrived in the ED by private car from home with a complaint of Manic Behavior (Increasing manic behavior over last 6 months or so. Mom Tricia has concerns of bipolar vs pt's THC consumption, not sure if manic behavior is all related to THC use or not. Pt doesn't want to quit using. Also diabetic.)  .The patient's current symptoms started/worsened 1 month(s) ago and during this time the symptoms have increased.   In the ED, pt was diagnosed with   Final diagnoses:   Polysubstance abuse (H)   Somnolence        Initial vitals were: BP: 120/69  Pulse: 104  Temp: 97.6  F (36.4  C)  Resp: 18  SpO2: 98 %   --------  Is the patient diabetic? Yes   If yes, last blood glucose? --     If yes, was this treated in the ED? --  --------  Is the patient inebriated (ETOH) No or Impaired on other substances? No  MSSA done? N/A  Last MSSA score: --    Were withdrawal symptoms treated? N/A  Does the patient have a seizure history? No. If yes, date of most recent seizure--  --------  Is the patient patient experiencing suicidal ideation? denies current or recent suicidal ideation     Homicidal ideation? denies current or recent homicidal ideation or behaviors.    Self-injurious behavior/urges? denies current or recent self injurious behavior or ideation.  ------  Was pt aggressive in the ED No  Was a code called No  Is the pt now cooperative? Yes  -------  Meds given in ED:   Medications   DULoxetine (CYMBALTA) DR capsule 90 mg (has no administration in time range)   FLUoxetine (PROzac) capsule 40 mg (has no administration in time range)   OLANZapine zydis (zyPREXA) ODT tab 10 mg (has no administration in time range)   liraglutide (VICTOZA) injection 1.2 mg (1.2 mg Subcutaneous Given 6/22/21 4319)   insulin degludec (TRESIBA) 100 UNIT/ML injection 25 Units (25 Units Subcutaneous  "Given 6/22/21 7773)      Family present during ED course? Yes  Family currently present? No    BACKGROUND  Does the patient have a cognitive impairment or developmental disability? No  Allergies: No Known Allergies.   Social demographics are   Social History     Socioeconomic History     Marital status: Single     Spouse name: None     Number of children: None     Years of education: None     Highest education level: None   Occupational History     None   Social Needs     Financial resource strain: None     Food insecurity     Worry: None     Inability: None     Transportation needs     Medical: None     Non-medical: None   Tobacco Use     Smoking status: Current Every Day Smoker     Types: Cigarettes     Smokeless tobacco: Never Used     Tobacco comment: Vapes daily, Smokes 1-2 cigarettes a day.   Substance and Sexual Activity     Alcohol use: No     Drug use: Yes     Types: Marijuana     Sexual activity: Never   Lifestyle     Physical activity     Days per week: None     Minutes per session: None     Stress: None   Relationships     Social connections     Talks on phone: None     Gets together: None     Attends Gnosticist service: None     Active member of club or organization: None     Attends meetings of clubs or organizations: None     Relationship status: None     Intimate partner violence     Fear of current or ex partner: None     Emotionally abused: None     Physically abused: None     Forced sexual activity: None   Other Topics Concern     None   Social History Narrative    Yamilka (\"Mackenzie\") lives at home with parents and younger brother. They have 8 pets in their home (dogs, cats, bunnies, and bearded dragons). Mackenzie is in 8th grade [id number] and is enjoying it so far. She is involved in jazz band. She likes horseback riding in her free time.        ASSESSMENT  Labs results   Labs Ordered and Resulted from Time of ED Arrival Up to the Time of Departure from the ED   GLUCOSE BY METER - Abnormal; Notable for the " following components:       Result Value    Glucose 288 (*)     All other components within normal limits   GLUCOSE MONITOR NURSING POCT   GLUCOSE MONITOR NURSING POCT   SARS-COV-2 (COVID-19) VIRUS RT-PCR      Imaging Studies: No results found for this or any previous visit (from the past 24 hour(s)).   Most recent vital signs /69   Pulse 104   Temp 97.6  F (36.4  C) (Oral)   Resp 18   SpO2 98%    Abnormal labs/tests/findings requiring intervention:---   Pain control: pt had none  Nausea control: pt had none    RECOMMENDATION  Are any infection precautions needed (MRSA, VRE, etc.)? No If yes, what infection? --  ---  Does the patient have mobility issues? independently. If yes, what device does the pt use? ---  ---  Is patient on 72 hour hold or commitment? No If on 72 hour hold, have hold and rights been given to patient? N/A  Are admitting orders written if after 10 p.m. ?N/A  Tasks needing to be completed:---     Ethel Chapman, RN    [phone] [facility]   [phone] [facility]

## 2021-06-23 NOTE — ED PROVIDER NOTES
[facility] [place], [place] EMERGENCY DEPARTMENT ([facility])   June 22, 2021 ED 10  History     Chief Complaint   Patient presents with     Manic Behavior     Increasing manic behavior over last 6 months or so. Mom Tricia has concerns of bipolar vs pt's THC consumption, not sure if manic behavior is all related to THC use or not. Pt doesn't want to quit using. Also diabetic.     HPI  Yamilka Franklin is a 21 year old female who presents to the emergency department for psychiatric evaluation. The history is obtained from the medical record and the patient's mother, Mandy.  Mackenzie is uanble to provide additional history as she is sedated with Zyprexa.       Mackenzie was recently  hospitalized at [facility] from 5/31-6/7/2021 for a suspected  substance-induced mood disorder.  She reported using K2 and marijuana and was experiencing auditory hallucinations and hyperexcitability.  At that time she was agitated and had to be placed on a 72-hour hold and treated with Zyprexa for aggitaiton.   She was discharged home with a supply of her medications, including Zyprexa which she was initiated while inpatient.   Her Adderall and Restoril were discontinued.  They noted that a substantial portion of her symptoms seem to be substance induced.      Her mother states that since returning home, she has had continued symptoms of agitation, irregular sleep, and delusional thinking and responding to internal stimuli.  Her mother states that she used marijuana and possibly K2 after returning home. She will often make delusional statements and has been found confused on the streets.  Her mother is concerned for her safety.             PAST MEDICAL HISTORY: History reviewed. No pertinent past medical history.    PAST SURGICAL HISTORY:   Past Surgical History:   Procedure Laterality Date     LAPAROSCOPY DIAGNOSTIC CHILD  11/8/2013    Procedure: LAPAROSCOPY DIAGNOSTIC CHILD;  Diagnostic Laparoscopy, Bilateral Gonadectomy ;  Surgeon: Leni Lubin  MD Saniya;  Location: [facility] OR       Past medical history, past surgical history, medications, and allergies were reviewed with the patient. Additional pertinent items: None    FAMILY HISTORY: History reviewed. No pertinent family history.    SOCIAL HISTORY:   Social History     Tobacco Use     Smoking status: Current Every Day Smoker     Types: Cigarettes     Smokeless tobacco: Never Used     Tobacco comment: Vapes daily, Smokes 1-2 cigarettes a day.   Substance Use Topics     Alcohol use: No     Social history was reviewed with the patient. Additional pertinent items: None      Patient's Medications   New Prescriptions    No medications on file   Previous Medications    ACETAMINOPHEN-CODEINE 300-30 MG PER TABLET    Take 1-2 tablets by mouth every 4 hours as needed for pain    AMPHETAMINE-DEXTROAMPHETAMINE (ADDERALL XR) 30 MG 24 HR CAPSULE    Take 30 mg by mouth    CLOBETASOL (TEMOVATE) 0.05 % EXTERNAL OINTMENT    CARLOS BID    DULOXETINE (CYMBALTA) 30 MG CAPSULE    Take 30 mg by mouth    ERGOCALCIFEROL (ERGOCALCIFEROL) 1.25 MG (86173 UT) CAPSULE    Take 50,000 Units by mouth    ESTRADIOL (ESTRACE) 2 MG TABLET    Take 4 mg by mouth    ESTROGENS, CONJUGATED, (PREMARIN) 0.3 MG TABLET    Take 1 tablet (0.3 mg) by mouth daily    FLUOXETINE (PROZAC) 40 MG CAPSULE    Take 40 mg by mouth    GLUCAGON EMERGENCY 1 MG KIT    INJECT 1 MG SUBCUTANEOUSLY ONCE FOR 1 DOSE    INSULIN ASPART (NOVOLOG FLEXPEN) 100 UNIT/ML PEN    Inject 150 Units Subcutaneous    INSULIN DEGLUDEC (TRESIBA FLEXTOUCH) 100 UNIT/ML PEN    Inject 26-30 Units Subcutaneous    LIRAGLUTIDE (VICTOZA PEN) 18 MG/3ML SOLUTION    Inject 1.2 mg Subcutaneous    LISINOPRIL (ZESTRIL) 20 MG TABLET    Take 20 mg by mouth    LORAZEPAM (ATIVAN) 1 MG TABLET    Take 1 tablet PO 2 hours prior to MRI   Modified Medications    No medications on file   Discontinued Medications    No medications on file        No Known Allergies     Review of Systems  A complete review of systems was  performed with pertinent positives and negatives noted in the HPI, and all other systems negative.    Physical Exam   BP: 120/69  Pulse: 104  Temp: 97.6  F (36.4  C)  Resp: 18  SpO2: 98 %      Physical Exam  Vitals signs and nursing note reviewed.   Constitutional:       General: She is not in acute distress.     Appearance: She is not diaphoretic.   HENT:      Head: Normocephalic and atraumatic.   Eyes:      Conjunctiva/sclera: Conjunctivae normal.      Pupils: Pupils are equal, round, and reactive to light.   Neck:      Musculoskeletal: Normal range of motion and neck supple.   Cardiovascular:      Rate and Rhythm: Normal rate.   Pulmonary:      Effort: Pulmonary effort is normal. No respiratory distress.      Breath sounds: No wheezing or rales.   Abdominal:      General: There is no distension.      Palpations: Abdomen is soft.      Tenderness: There is no abdominal tenderness.   Musculoskeletal: Normal range of motion.         General: No tenderness, deformity or signs of injury.   Skin:     General: Skin is warm and dry.      Findings: No bruising.   Neurological:      Mental Status: She is alert.      Comments: Mackenzie is sedated, but arouses with verbal commands, she is unable to converse at this time.    Psychiatric:      Comments: Unable to assess mental status due to her sedation         ED Course        Procedures           The medical record was reviewed and interpreted.                 No results found for this or any previous visit (from the past 24 hour(s)).  Medications - No data to display          Assessments & Plan (with Medical Decision Making)   Mackenzie is a 21 year old female who presents with ongoing hyperactivity, pressured speech and delusional thinking for the past several weeks.  She was hospitalized at [facility] for mental health 2 weeks ago where she was started on Zyprexa but was thought to have a substance induced mood and psychotic disorder.  He rmother is concerned that she is  misdiagnosed and is concerned for Bipolar disorder with merissa.  Mackenzie will be admitted to mental health for her symptoms of merissa.     I have reviewed the nursing notes.    I have reviewed the findings, diagnosis, plan and need for follow up with the patient.    New Prescriptions    No medications on file       Final diagnoses:   None       6/22/2021   [facility] EMERGENCY DEPARTMENT     Gianluca Grace MD  06/22/21 4908       Gianluca Grace MD  06/22/21 6976       Gianluca Grace MD  06/23/21 0001

## 2021-06-23 NOTE — PLAN OF CARE
sInitial Psychosocial Assessment    I have reviewed the chart, met with the patient, and developed Care Plan.  Information for assessment was obtained from: Patient and chart review        Presenting Problem:   Patient is a 21-year-old female admitted to [place] [address] on 6/23/2021. Patient reports she was brought to the hospital by her mother due to merissa. Patient was pleasant and cooperative during the interview. Patient reports she has not been formally diagnosed as bipolar but would like to be evaluated for it. She believes her symptoms of merissa and changes in her mood may be attributed to bipolar disorder. Patient reports her mother is a nurse and has experience in working with bipolar clients and she also shares the belief that patient's symptoms are consistent with bipolar diagnosis. Patient's goal for this hospitalization are to feel stable, get her medications adjusted, and to find out if she is bipolar. Patient denies any SI/SIB/HI.        History of Mental Health and Chemical Dependency:  Patient has a past  Hx of ADHD and unspecified mood disorder. [place] [facility] [place]. This is patient's second hospitalization related to merissa. Patient reports marijuana use at least 2x per week. Reports daily vaping. Denies using any other illegal substances. No Hx of CD treatment.     Family Description (Constellation, Family Psychiatric History):  Patient was adopted from an orphanage in [place] when she was 4-years-old. Patient has a younger brother who is also adopted. Unknown MH history of biological family. Patient is single and has no children.       Significant Life Events (Illness, Abuse, Trauma, Death):   Hx of trauma and attachment issues. No further details provided.     Living Situation:  Lives with her parents    Educational Background:  Completed HS. Currently enrolled at a [employer] for general classes.     Occupational History:  Currently unemployed. Working at a job  at [employer]    Financial Status:  Family support    Legal Issues:  Voluntary    Ethnic/Cultural Issues:   No issues endorsed     Spiritual Orientation:  No issues endorsed     Service History:  None    Social Functioning (organization, interests):  Limited due to exacerbated mental health symptoms.     Current Treatment Providers are:  Primary Outpatient Psychiatrist: Dr, Johnson, [facility], [place]  Therapist: Daysi Kamara   Case Management Services: Provided by [employer]    Patient is on waiting list for [place] alternatives OP    Social Service Assessment/Plan:  Patient will have ongoing psychiatric assessment. Medications will be reviewed and adjusted per MD as indicated. Outpatient providers will be contacted for care coordination. Hospital staff will provide a safe environment and a therapeutic milieu. Patient will be encouraged to participate in unit groups and activities. CTC will continue to assess needs and  ensure appropriate follow up care is in place.

## 2021-06-23 NOTE — PROGRESS NOTES
06/23/21 1224   General Information   Date Initially Attended OT 06/23/21     Date of Service: June 23, 2021    Description: Yamilka attended 1 occupational therapy group today.     11:15 - 12:00. 2 total participants. Occupational Therapy Clinic. Purpose: Coping skill exploration, creative expression within personally meaningful activities, and clinical observation of social, cognitive, and kinesthetic performance skills.  Pt Response: Chosen activity: Simple coloring. I to initiate, gather materials, sequence and adjust to workspace demands as needed. Demonstrated ~30 minutes of focus and adequate planning for this task. Able to ask for assistance and appeared comfortable interacting with peers and staff. Made a point to give peer personal space as she sat at an alterate table to complete her project.     Assessment: Demonstrates benefit from engagement in OT groups that support healthy recovery, specifically exploration of positive coping skills for symptom management, relapse prevention, and resumption of personal roles, routines and daily occupations.    Plan: OT staff will meet with pt to review the role of occupational therapy and explain the value of having them involved in their treatment plan including options to meet current needs/self-identified goals. As group attendance is established,  continued clinical observations will be made and Pt will be given self-assessment to inform OT initial assessment. Continue graded occupation-based activities for increased success towards goal and ongoing assessment.     Kajal Leyva, OT on 6/23/2021 at 12:25 PM

## 2021-06-23 NOTE — PLAN OF CARE
Pt slept a total of 5 hours after being admitted to the unit around 0045. She woke around 0615 and asked for a cigarette from staff but willingly accepted redirection re: this request and returned to bed. No PRN's were administered and no concerns noted at this time.     Problem: Suicidal Behavior  Goal: Suicidal Behavior is Absent or Managed  Outcome: No Change

## 2021-06-23 NOTE — ED NOTES
"Pt sleeping on cart, mom at bedside, mom states \"Her zyprexa kicked in and she will be hard to wake up and asess now.\"  "

## 2021-06-23 NOTE — PLAN OF CARE
Patient pleasant and cooperative, visible in milieu.  Flat affect, denies SI/SIB, denies mental health symptoms.  BG this shift 268 and 212.  Received insulin per carb count and coverage.  Patient took scheduled medications with no problem.  Presently awake in room, will continue to monitor closely

## 2021-06-24 LAB
ALBUMIN SERPL-MCNC: 3.3 G/DL (ref 3.4–5)
ALP SERPL-CCNC: 69 U/L (ref 40–150)
ALT SERPL W P-5'-P-CCNC: 57 U/L (ref 0–50)
ANION GAP SERPL CALCULATED.3IONS-SCNC: 8 MMOL/L (ref 3–14)
AST SERPL W P-5'-P-CCNC: 43 U/L (ref 0–45)
BASOPHILS # BLD AUTO: 0 10E9/L (ref 0–0.2)
BASOPHILS NFR BLD AUTO: 0.2 %
BILIRUB SERPL-MCNC: 0.3 MG/DL (ref 0.2–1.3)
BUN SERPL-MCNC: 10 MG/DL (ref 7–30)
CALCIUM SERPL-MCNC: 9 MG/DL (ref 8.5–10.1)
CHLORIDE SERPL-SCNC: 100 MMOL/L (ref 94–109)
CHOLEST SERPL-MCNC: 195 MG/DL
CO2 SERPL-SCNC: 25 MMOL/L (ref 20–32)
CREAT SERPL-MCNC: 0.51 MG/DL (ref 0.52–1.04)
DIFFERENTIAL METHOD BLD: NORMAL
EOSINOPHIL # BLD AUTO: 0.1 10E9/L (ref 0–0.7)
EOSINOPHIL NFR BLD AUTO: 1 %
ERYTHROCYTE [DISTWIDTH] IN BLOOD BY AUTOMATED COUNT: 12.9 % (ref 10–15)
FOLATE SERPL-MCNC: 18.2 NG/ML
GFR SERPL CREATININE-BSD FRML MDRD: >90 ML/MIN/{1.73_M2}
GLUCOSE BLDC GLUCOMTR-MCNC: 144 MG/DL (ref 70–99)
GLUCOSE BLDC GLUCOMTR-MCNC: 144 MG/DL (ref 70–99)
GLUCOSE BLDC GLUCOMTR-MCNC: 175 MG/DL (ref 70–99)
GLUCOSE BLDC GLUCOMTR-MCNC: 201 MG/DL (ref 70–99)
GLUCOSE BLDC GLUCOMTR-MCNC: 249 MG/DL (ref 70–99)
GLUCOSE BLDC GLUCOMTR-MCNC: NORMAL MG/DL (ref 70–99)
GLUCOSE SERPL-MCNC: 171 MG/DL (ref 70–99)
HCT VFR BLD AUTO: 40.9 % (ref 35–47)
HDLC SERPL-MCNC: 72 MG/DL
HGB BLD-MCNC: 13.2 G/DL (ref 11.7–15.7)
IMM GRANULOCYTES # BLD: 0 10E9/L (ref 0–0.4)
IMM GRANULOCYTES NFR BLD: 0.4 %
LDLC SERPL CALC-MCNC: 69 MG/DL
LYMPHOCYTES # BLD AUTO: 2.7 10E9/L (ref 0.8–5.3)
LYMPHOCYTES NFR BLD AUTO: 33 %
MCH RBC QN AUTO: 27.7 PG (ref 26.5–33)
MCHC RBC AUTO-ENTMCNC: 32.3 G/DL (ref 31.5–36.5)
MCV RBC AUTO: 86 FL (ref 78–100)
MONOCYTES # BLD AUTO: 0.5 10E9/L (ref 0–1.3)
MONOCYTES NFR BLD AUTO: 6 %
NEUTROPHILS # BLD AUTO: 4.9 10E9/L (ref 1.6–8.3)
NEUTROPHILS NFR BLD AUTO: 59.4 %
NONHDLC SERPL-MCNC: 123 MG/DL
NRBC # BLD AUTO: 0 10*3/UL
NRBC BLD AUTO-RTO: 0 /100
PLATELET # BLD AUTO: 306 10E9/L (ref 150–450)
PLATELET # BLD EST: NORMAL 10*3/UL
POTASSIUM SERPL-SCNC: 3.9 MMOL/L (ref 3.4–5.3)
PROT SERPL-MCNC: 8.3 G/DL (ref 6.8–8.8)
RBC # BLD AUTO: 4.77 10E12/L (ref 3.8–5.2)
RBC MORPH BLD: NORMAL
SODIUM SERPL-SCNC: 133 MMOL/L (ref 133–144)
TRIGL SERPL-MCNC: 272 MG/DL
TSH SERPL DL<=0.005 MIU/L-ACNC: 1.15 MU/L (ref 0.4–4)
VIT B12 SERPL-MCNC: 467 PG/ML (ref 193–986)
WBC # BLD AUTO: 8.2 10E9/L (ref 4–11)

## 2021-06-24 PROCEDURE — 82607 VITAMIN B-12: CPT | Performed by: STUDENT IN AN ORGANIZED HEALTH CARE EDUCATION/TRAINING PROGRAM

## 2021-06-24 PROCEDURE — 124N000002 HC R&B MH UMMC

## 2021-06-24 PROCEDURE — 82746 ASSAY OF FOLIC ACID SERUM: CPT | Performed by: STUDENT IN AN ORGANIZED HEALTH CARE EDUCATION/TRAINING PROGRAM

## 2021-06-24 PROCEDURE — 999N001017 HC STATISTIC GLUCOSE BY METER IP

## 2021-06-24 PROCEDURE — 84443 ASSAY THYROID STIM HORMONE: CPT | Performed by: STUDENT IN AN ORGANIZED HEALTH CARE EDUCATION/TRAINING PROGRAM

## 2021-06-24 PROCEDURE — 85025 COMPLETE CBC W/AUTO DIFF WBC: CPT | Performed by: STUDENT IN AN ORGANIZED HEALTH CARE EDUCATION/TRAINING PROGRAM

## 2021-06-24 PROCEDURE — 250N000013 HC RX MED GY IP 250 OP 250 PS 637: Performed by: STUDENT IN AN ORGANIZED HEALTH CARE EDUCATION/TRAINING PROGRAM

## 2021-06-24 PROCEDURE — 80061 LIPID PANEL: CPT | Performed by: STUDENT IN AN ORGANIZED HEALTH CARE EDUCATION/TRAINING PROGRAM

## 2021-06-24 PROCEDURE — 36415 COLL VENOUS BLD VENIPUNCTURE: CPT | Performed by: STUDENT IN AN ORGANIZED HEALTH CARE EDUCATION/TRAINING PROGRAM

## 2021-06-24 PROCEDURE — 80053 COMPREHEN METABOLIC PANEL: CPT | Performed by: STUDENT IN AN ORGANIZED HEALTH CARE EDUCATION/TRAINING PROGRAM

## 2021-06-24 PROCEDURE — 99231 SBSQ HOSP IP/OBS SF/LOW 25: CPT | Mod: GC | Performed by: PSYCHIATRY & NEUROLOGY

## 2021-06-24 RX ORDER — LITHIUM CARBONATE 150 MG/1
150 CAPSULE ORAL AT BEDTIME
Status: DISCONTINUED | OUTPATIENT
Start: 2021-06-24 | End: 2021-06-25 | Stop reason: HOSPADM

## 2021-06-24 RX ORDER — LITHIUM CARBONATE 150 MG/1
150 CAPSULE ORAL AT BEDTIME
Qty: 30 CAPSULE | Refills: 0 | Status: SHIPPED | OUTPATIENT
Start: 2021-06-24

## 2021-06-24 RX ADMIN — OLANZAPINE 10 MG: 10 TABLET, FILM COATED ORAL at 21:09

## 2021-06-24 RX ADMIN — LISINOPRIL 20 MG: 20 TABLET ORAL at 08:28

## 2021-06-24 RX ADMIN — LIRAGLUTIDE 1.2 MG: 6 INJECTION SUBCUTANEOUS at 09:22

## 2021-06-24 RX ADMIN — ERGOCALCIFEROL 50000 UNITS: 1.25 CAPSULE, LIQUID FILLED ORAL at 08:27

## 2021-06-24 RX ADMIN — GABAPENTIN 200 MG: 100 CAPSULE ORAL at 19:04

## 2021-06-24 RX ADMIN — ESTRADIOL 4 MG: 2 TABLET ORAL at 08:27

## 2021-06-24 RX ADMIN — NICOTINE POLACRILEX 4 MG: 2 GUM, CHEWING BUCCAL at 19:41

## 2021-06-24 RX ADMIN — NICOTINE 7 MG/24 HR DAILY TRANSDERMAL PATCH 1 PATCH: at 08:34

## 2021-06-24 RX ADMIN — NICOTINE 7 MG/24 HR DAILY TRANSDERMAL PATCH 1 PATCH: at 16:10

## 2021-06-24 RX ADMIN — INSULIN DEGLUDEC INJECTION 27 UNITS: 100 INJECTION, SOLUTION SUBCUTANEOUS at 21:15

## 2021-06-24 RX ADMIN — LITHIUM CARBONATE 150 MG: 150 CAPSULE, GELATIN COATED ORAL at 21:09

## 2021-06-24 RX ADMIN — NICOTINE POLACRILEX 2 MG: 2 GUM, CHEWING BUCCAL at 12:44

## 2021-06-24 RX ADMIN — ATOMOXETINE 40 MG: 40 CAPSULE ORAL at 08:28

## 2021-06-24 ASSESSMENT — MIFFLIN-ST. JEOR: SCORE: 1540.17

## 2021-06-24 ASSESSMENT — ACTIVITIES OF DAILY LIVING (ADL)
ORAL_HYGIENE: INDEPENDENT
DRESS: INDEPENDENT
HYGIENE/GROOMING: INDEPENDENT
LAUNDRY: WITH SUPERVISION
LAUNDRY: WITH SUPERVISION
HYGIENE/GROOMING: INDEPENDENT
DRESS: INDEPENDENT
ORAL_HYGIENE: INDEPENDENT

## 2021-06-24 NOTE — PLAN OF CARE
Assessment/Intervention/Current Symptoms and Care Coordination: Met with team. Reviewed patient's chart and progress notes. Writer called [facility] at [phone]. Writer placed referral for IOP. Writer called [facility] at [phone]. Referral was placed for the [facility] program (specialty program for bipolar).             Discharge Plan or Goal: Will discharge home. Will follow up with outpatient providers.          Barriers to Discharge: Poor impulse control. Needs clinical stabilization and medication management.          Referral Status: Was referred to [facility]. Was referred to the [facility]          Legal Status: Voluntary

## 2021-06-24 NOTE — PLAN OF CARE
Pt was visible in the milieu. Presented with full range affect. Pt denies SI/SIB/VH/AH. Pt denied depression and anxiety. Pt requested to be discharged and signed a 12 hour intent to leave but receded it after talking to the resident. Pt's father visited with pt this evening. Pt was calm and cooperative. Pt took her medications and ate supper. Will monitor.      Problem: Adult Inpatient Plan of Care  Goal: Absence of Hospital-Acquired Illness or Injury  Outcome: Improving  Goal: Readiness for Transition of Care  Outcome: Improving     Problem: Behavioral Health Plan of Care  Goal: Adheres to Safety Considerations for Self and Others  Outcome: Improving     Problem: Suicidal Behavior  Goal: Suicidal Behavior is Absent or Managed  Outcome: Improving

## 2021-06-24 NOTE — PLAN OF CARE
Problem: Adult Inpatient Plan of Care  Goal: Readiness for Transition of Care  Outcome: Improving    Nursing Assessment    Recent Vitals: B/P:127/80  T: 97.6 P: 108 R: 16    General Shift Summary  Visible in milieu, attends groups, is social with staff and peers. Pt is calm and cooperative. No signs of merissa evident. Pt was inquiring to if she could be released today, but after talking with doctors decided to stay. Pt is planning on discharging home tomorrow. Pt states they feel much more clear and feel they are back to their normal self. Denies all MH sx at this time.     Patient is medication compliant and reports no side effects. Pt did not receive any PRN medications this shift.     Hygiene and appetite are good.          Wilian Shaikh RN

## 2021-06-24 NOTE — PLAN OF CARE
Mackenzie appeared to sleep a total of 7 hours this night shift.  No prns or snacks given or requested.  BG at 0200 = 144.

## 2021-06-24 NOTE — PHARMACY-ADMISSION MEDICATION HISTORY
"  Admission Medication History Completed by Pharmacy    See [facility] Admission Navigator for allergy information, preferred outpatient pharmacy, prior to admission medications and immunization status.     Medication History Sources:     Patient    Surescripts dispense report    Care Everywhere records    Changes made to PTA medication list (reason):    Added: None      Deleted:   o Acetaminophen-codeine 300-30mg, 1-2 tablets by mouth every 4 hours as needed for pain (per pt report)  o Amphetamine-dextroamphetamine (Adderall XR) 30mg 24 hr caps, 30mg by mouth daily (no longer taking per patient and chart review. Last filled per PDMP on 4/8/21)  o Clobetasol 0.05% external ointment, apply 2 times daily (per pt report)      Changed:  o Atomoxetine 40mg daily --> atomoxetine 40mg daily as needed  o Duloxetine 30mg daily --> duloxetine 90mg by mouth daily    o Insulin aspart 150 units --> insulin aspart 3 times daily subcutaneous, 3 units per 50mg/dL above 150 and 1 unit for every 2g of carbs    Additional Information:    Patient was a reliable historian. She reports that her mother manages her medication but that she also knows them herself.    Of note, patient recently finished a 7 day course of cephalexin 500mg by mouth 2 times daily for UTI on 6/22/21    She reports she takes her weekly ergocalciferol on Mondays    Of note, she reported taking estradiol 4mg (2 tablets) daily. [facility] and [facility] report from 5/28/21 that states \"take 4 mg by mouth two times a day. You may take them all at once, so 8 mg total once daily, if needed\".    Prior to Admission medications    Medication Sig Last Dose Taking? Auth Provider   atomoxetine (STRATTERA) 40 MG capsule Take 40 mg by mouth daily as needed  6/21/2021 Yes Reported, Patient   DULoxetine (CYMBALTA) 30 MG capsule Take 90 mg by mouth daily 6/21/2021 Yes Reported, Patient   ergocalciferol (ERGOCALCIFEROL) 1.25 MG (35012 UT) capsule Take " 50,000 Units by mouth weekly on Mondays 6/21/2021 Yes Reported, Patient   estradiol (ESTRACE) 2 MG tablet Take 4 mg by mouth daily 6/21/2021 Yes Reported, Patient   FLUoxetine (PROZAC) 40 MG capsule Take 40 mg by mouth daily  6/21/2021 Yes Reported, Patient   gabapentin (NEURONTIN) 100 MG capsule Take 200 mg by mouth 2 times daily as needed for anxiety 6/21/2021 Yes Reported, Patient   GLUCAGON EMERGENCY 1 MG kit INJECT 1 MG SUBCUTANEOUSLY ONCE FOR 1 DOSE  Yes Reported, Patient   insulin aspart (NOVOLOG FLEXPEN) 100 UNIT/ML pen Inject 3 times daily with meals.  1 Unit for every 2 grams of carb  3 Units for every 50 above 150 mg/dL 6/21/2021 Yes Reported, Patient   insulin degludec (TRESIBA FLEXTOUCH) 100 UNIT/ML pen Inject 26-30 Units Subcutaneous daily 6/21/2021 Yes Reported, Patient   liraglutide (VICTOZA PEN) 18 MG/3ML solution Inject 1.2 mg Subcutaneous 6/21/2021 Yes Reported, Patient   lisinopril (ZESTRIL) 20 MG tablet Take 20 mg by mouth daily 6/21/2021 Yes Reported, Patient   OLANZapine (ZYPREXA) 10 MG tablet Take 10 mg by mouth every evening 6/21/2021 Yes Reported, Patient   LORazepam (ATIVAN) 1 MG tablet Take 1 tablet PO 2 hours prior to MRI   Charles Shaw MD       Date completed: 06/24/21    Medication history completed by: Darvin Carbone, PGY1 PharmD Resident

## 2021-06-24 NOTE — PROGRESS NOTES
"    ----------------------------------------------------------------------------------------------------------  [facility]  Psychiatric Progress Note  Hospital Day #1     Subjective     The patient's care was discussed with the treatment team and chart notes were reviewed.     Sleep:  7 hours (06/24/21 0632)  Prescribed Medications: Taken as prescribed   PRN Medications: acetaminophen, alum & mag hydroxide-simethicone, glucose **OR** dextrose **OR** glucagon, gabapentin, insulin aspart, melatonin, nicotine polacrilex, OLANZapine **OR** OLANZapine, polyethylene glycol   No prns taken.    Per Staff Notes: No acute events overnight. Pt requested to be discharged and signed a 12 hour intent to leave but receded it after talking with the medical team.     Per Interview:    Mackenzie stated she is \"clearer\" today. Despite not liking to be in the hospital she agreed to continue to stay and get her medications arranged so that she can make plans to return to school this fall. She also agreed with the plan to start low dose lithium at bedtime today. We informed her of the medication side effects and the need for the follow up labs. She understood and will monitor any new symptoms. Likely discharge tomorrow.     The risks, benefits, alternatives, and side effects of treatments including no treatment have been discussed and are understood by the patient and other caregivers.    Review of systems:  Complete psychiatric ROS is negative unless otherwise noted above.      Objective     /80   Pulse 93   Temp 97.6  F (36.4  C) (Oral)   Resp 16   Ht 1.676 m (5' 6\")   Wt 75.3 kg (166 lb 1.6 oz)   SpO2 100%   BMI 26.81 kg/m      Appearance:  awake, alert, dressed in hospital scrubs and appeared as age stated  Muscle Strength and Tone: normal  Gait and Station: Normal  Behavior (Psychomotor):  no evidence of tardive dyskinesia, dystonia, or tics and fidgeting  Eye Contact:  fair  Speech:  clear, " coherent and normal prosody  Mood:  Good  Affect:  mood congruent  Attitude:  cooperative  Thought Process:  linear and goal oriented, slightly disorganized   Thought Content:  no evidence of suicidal ideation or homicidal ideation, no evidence of psychotic thought, no auditory hallucinations present and no visual hallucinations present  Associations:  no loose associations  Insight:  limited  Judgment:  limited  Oriented to:  time, person, and place  Attention Span and Concentration: poor, distracted  Recent and Remote Memory:  fair  Language: Fluent in English with appropriate syntax and vocabulary.  Fund of Knowledge: appropriate      Recent Results (from the past 24 hour(s))   Glucose by meter    Collection Time: 06/23/21  6:10 PM   Result Value Ref Range    Glucose 226 (H) 70 - 99 mg/dL   Glucose by meter    Collection Time: 06/23/21  8:26 PM   Result Value Ref Range    Glucose 271 (H) 70 - 99 mg/dL   Glucose by meter    Collection Time: 06/24/21  2:23 AM   Result Value Ref Range    Glucose 144 (H) 70 - 99 mg/dL   CBC with platelets differential    Collection Time: 06/24/21  8:13 AM   Result Value Ref Range    WBC 8.2 4.0 - 11.0 10e9/L    RBC Count 4.77 3.8 - 5.2 10e12/L    Hemoglobin 13.2 11.7 - 15.7 g/dL    Hematocrit 40.9 35.0 - 47.0 %    MCV 86 78 - 100 fl    MCH 27.7 26.5 - 33.0 pg    MCHC 32.3 31.5 - 36.5 g/dL    RDW 12.9 10.0 - 15.0 %    Platelet Count 306 150 - 450 10e9/L    Diff Method Automated Method     % Neutrophils 59.4 %    % Lymphocytes 33.0 %    % Monocytes 6.0 %    % Eosinophils 1.0 %    % Basophils 0.2 %    % Immature Granulocytes 0.4 %    Nucleated RBCs 0 0 /100    Absolute Neutrophil 4.9 1.6 - 8.3 10e9/L    Absolute Lymphocytes 2.7 0.8 - 5.3 10e9/L    Absolute Monocytes 0.5 0.0 - 1.3 10e9/L    Absolute Eosinophils 0.1 0.0 - 0.7 10e9/L    Absolute Basophils 0.0 0.0 - 0.2 10e9/L    Abs Immature Granulocytes 0.0 0 - 0.4 10e9/L    Absolute Nucleated RBC 0.0     RBC Morphology Normal      Platelet Estimate Confirming automated cell count    Comprehensive metabolic panel    Collection Time: 06/24/21  8:13 AM   Result Value Ref Range    Sodium 133 133 - 144 mmol/L    Potassium 3.9 3.4 - 5.3 mmol/L    Chloride 100 94 - 109 mmol/L    Carbon Dioxide 25 20 - 32 mmol/L    Anion Gap 8 3 - 14 mmol/L    Glucose 171 (H) 70 - 99 mg/dL    Urea Nitrogen 10 7 - 30 mg/dL    Creatinine 0.51 (L) 0.52 - 1.04 mg/dL    GFR Estimate >90 >60 mL/min/[1.73_m2]    GFR Estimate If Black >90 >60 mL/min/[1.73_m2]    Calcium 9.0 8.5 - 10.1 mg/dL    Bilirubin Total 0.3 0.2 - 1.3 mg/dL    Albumin 3.3 (L) 3.4 - 5.0 g/dL    Protein Total 8.3 6.8 - 8.8 g/dL    Alkaline Phosphatase 69 40 - 150 U/L    ALT 57 (H) 0 - 50 U/L    AST 43 0 - 45 U/L   Lipid panel    Collection Time: 06/24/21  8:13 AM   Result Value Ref Range    Cholesterol 195 <200 mg/dL    Triglycerides 272 (H) <150 mg/dL    HDL Cholesterol 72 >49 mg/dL    LDL Cholesterol Calculated 69 <100 mg/dL    Non HDL Cholesterol 123 <130 mg/dL   TSH with free T4 reflex and/or T3 as indicated    Collection Time: 06/24/21  8:13 AM   Result Value Ref Range    TSH 1.15 0.40 - 4.00 mU/L   Vitamin B12    Collection Time: 06/24/21  8:13 AM   Result Value Ref Range    Vitamin B12 467 193 - 986 pg/mL   Glucose by meter    Collection Time: 06/24/21  8:21 AM   Result Value Ref Range    Glucose 175 (H) 70 - 99 mg/dL   Glucose by meter    Collection Time: 06/24/21 11:30 AM   Result Value Ref Range    Glucose 362 (H) 70 - 99 mg/dL   Glucose by meter    Collection Time: 06/24/21 12:09 PM   Result Value Ref Range    Glucose 249 (H) 70 - 99 mg/dL        Assessment & Plan      Yamilka Franklin is a 21 year old female with a previously documented history of ADHD, mood disorder (depression), DMT2 and 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 Significant symptoms include diminished need for sleep,  mood lability, sleep issues, disorganization and substance use.      Her last psychiatric hospitalization was in 05/31/2021 at [facility] for psychosis in the context of substance use. Current psychosocial stressors include school issues and family dynamics (patient has a brother with eating disorder and mom's focus on him seems to be difficult to accept for her) which she has been coping with by using substances, acting out to self and running (patient was missing for 12 hours recently).  Patient's support system includes family.  Substance use does appear to be playing a contributing role in the patient's presentation (cannabis, K2 use). The genetic loading is unknown, patient was adopted. 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  She denies SI or HI. Her reported symptoms of diminished need for sleep, disorganized thoughts, hyperactivity, delusional thinking, risky sexual behavior, hearing and seeing things are consistent with diagnoses of substance-induced psychosis and merissa vs primary mood disorder, possibly bipolar disorder. Patient has a history of depression since age 15, however has never had merissa symptoms before.  At this point, it is unknown if this is first episode of merissa following several years of depression in the context of bipolar disorder, or substance-induced mood disorder.  Per chart review, she was hospitalized at [facility] for mental health two weeks ago where she was started on Zyprexa but was thought to have a substance induced mood and psychotic disorder.     Optimization of medications to target these symptom clusters in addition to evaluation of adquate outpatient supports will be targets for treatment during this admission.      Patient warrants inpatient psychiatric hospitalization to maintain her safety.  Disposition pending clinical stabilization, medication optimization and development of an appropriate discharge plan.     # Unspecified mood disorder         Bipolar disorder vs substance-induced mood disorder   -Lithium 150mg at bedtime started on 6/24/    # Unspecified psychosis   -Olanzapine  10mg at bedtime      # ADHD  -atomoxetine (STRATTERA) capsule 40     Hospital Course: Mackenzie was admitted to [facility] with Attending Physician Jim Gonzalez.  DULoxetine (CYMBALTA) DR capsule 90 mg   and FLUoxetine (PROzac) capsule 40 mg daily discontinue to help her clear the merissa sx. Lithium 150mg at bedtime started nn 6/24.     Discontinued Medications at This Admission:  - DULoxetine (CYMBALTA) DR capsule 90 mg   (to clear manic sx)  - FLUoxetine (PROzac) capsule 40 mg   (to clear manic sx)     Medical Diagnoses:    # Diabetes Mellitus, Type 2  Per Medicine consult on 6/23.  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 Lepe at [facility]. Chromosomes are 46 XY, no mosaicism. No evidence of testosterone production on hCG and ACTH stim tests. Hx of exploratory laparotomy in 2013, with removal of streak gonads. Both Wolffian and Mullerian structures were identified. Previous MRI had not shown presence of uterus, vagina or gonads. This was confirmed at laparotomy with the exception of what is noted above.   See Dr. Reyez's note of 1/29/16 for details. She then had onset of HRT as noted above and vaginoplasty this past summer. Identifies as female gender identity.  - Estradiol PO 4mg daily     Medical course:  Medicine consult for DM management.     Consults: Medicine.    Lab/Imagin2021 08:13 2021 08:21 2021 11:30 2021 12:09   Glucose 171 (H) 175 (H) 362 (H) 249 (H)       Legal Status: Voluntary    Disposition: Pending stabilization, medication optimization, and formulation of a safe discharge plan. Likely discharge tomorrow.     Safety Assessment:   Behavioral Orders   Procedures     Code 1 - Restrict to Unit     Routine Programming     As clinically indicated     Status 15     Every 15 minutes.        Patient seen and discussed with my attending physician, Dr. Tatianna Howard who is in agreement with my assessment and plan.    Jaime Alonzo MD  PGY-1 Psychiatry Resident    ___________  Attestation:  This patient has been seen and evaluated by me, Jemma Howard MD.  I have discussed this patient with the psychiatry resident and I agree with the findings and plan in this note.    I have reviewed today's vital signs, medications, labs and imaging. Jemma Howard MD

## 2021-06-25 VITALS
DIASTOLIC BLOOD PRESSURE: 61 MMHG | BODY MASS INDEX: 26.69 KG/M2 | RESPIRATION RATE: 16 BRPM | TEMPERATURE: 98.5 F | HEIGHT: 66 IN | WEIGHT: 166.1 LBS | HEART RATE: 96 BPM | SYSTOLIC BLOOD PRESSURE: 96 MMHG | OXYGEN SATURATION: 100 %

## 2021-06-25 LAB
GLUCOSE BLDC GLUCOMTR-MCNC: 258 MG/DL (ref 70–99)
GLUCOSE BLDC GLUCOMTR-MCNC: 84 MG/DL (ref 70–99)
INTERPRETATION ECG - MUSE: NORMAL

## 2021-06-25 PROCEDURE — 999N001017 HC STATISTIC GLUCOSE BY METER IP

## 2021-06-25 PROCEDURE — 250N000013 HC RX MED GY IP 250 OP 250 PS 637: Performed by: STUDENT IN AN ORGANIZED HEALTH CARE EDUCATION/TRAINING PROGRAM

## 2021-06-25 PROCEDURE — 99239 HOSP IP/OBS DSCHRG MGMT >30: CPT | Mod: GC | Performed by: PSYCHIATRY & NEUROLOGY

## 2021-06-25 PROCEDURE — 250N000009 HC RX 250: Performed by: STUDENT IN AN ORGANIZED HEALTH CARE EDUCATION/TRAINING PROGRAM

## 2021-06-25 RX ADMIN — LIRAGLUTIDE 1.2 MG: 6 INJECTION SUBCUTANEOUS at 08:57

## 2021-06-25 RX ADMIN — ERGOCALCIFEROL 50000 UNITS: 1.25 CAPSULE, LIQUID FILLED ORAL at 08:15

## 2021-06-25 RX ADMIN — ATOMOXETINE 40 MG: 40 CAPSULE ORAL at 08:15

## 2021-06-25 RX ADMIN — ESTRADIOL 4 MG: 2 TABLET ORAL at 08:15

## 2021-06-25 RX ADMIN — NICOTINE 7 MG/24 HR DAILY TRANSDERMAL PATCH 1 PATCH: at 08:15

## 2021-06-25 ASSESSMENT — ACTIVITIES OF DAILY LIVING (ADL)
DRESS: INDEPENDENT
LAUNDRY: WITH SUPERVISION
ORAL_HYGIENE: INDEPENDENT
HYGIENE/GROOMING: INDEPENDENT

## 2021-06-25 NOTE — PLAN OF CARE
"Pt was visible in milieu and social with staff. Pt presented with full range affect. Endorsed anxiety \"3\". Denies SI/SIB/AH/VH. Pt was upset earlier in the shift and was saying she wanted to be discharged  today. Staffed talked to pt and she calmed down. Pt's sister visited and pt stated \"that really helped me\".  Pt was ate supper and took medications and insulin as ordered.      Problem: Adult Inpatient Plan of Care  Goal: Absence of Hospital-Acquired Illness or Injury  Outcome: Improving     Problem: Behavioral Health Plan of Care  Goal: Adheres to Safety Considerations for Self and Others  Outcome: Improving  Intervention: Develop and Maintain Individualized Safety Plan  Recent Flowsheet Documentation  Taken 6/24/2021 1900 by Capo Lane, RN  Safety Measures: safety plan reviewed     Problem: Suicidal Behavior  Goal: Suicidal Behavior is Absent or Managed  Outcome: Improving     Problem: Behavioral Health Plan of Care  Goal: Absence of New-Onset Illness or Injury  Intervention: Identify and Manage Fall Risk  Recent Flowsheet Documentation  Taken 6/24/2021 1900 by Capo Lane, RN  Safety Measures: safety plan reviewed     "

## 2021-06-25 NOTE — DISCHARGE INSTRUCTIONS
Behavioral Discharge Planning and Instructions    Summary: You were admitted on 6/22/2021  due to merissa symptoms and psychosis .  You were treated by Dr. Amelia Nicholson and discharged on 6/25/2021 from [facility] to Home    Main Diagnosis: ADHD, mood disorder (depression)    Health Care Follow-up:   Appointment Date/Time: 8/27/2021 at 8:00 AM. ***This will be a virtual appointment***  [facility]    Psychiatrist: Stacy Mckeon    Phone Number: [phone]        Additional CD Resources:  [facility]:  Phone Number: [phone]  Fax Number: [phone]     [employer]  Low Intensity treatment  Call: [phone]  Fax: [phone]     [facility]   [address] [address]   Phone: [phone]   [email or web address]      Community Drug & Alcohol Support Resources   Alcoholics Anonymous   24/7 Phone Line: 296.197.5383   https://[place].org/   https://a[place].org/   For additional list of online meetings: http://aa-intergroup.org        Attend all scheduled appointments with your outpatient providers. Call at least 24 hours in advance if you need to reschedule an appointment to ensure continued access to your outpatient providers.     Major Treatments, Procedures and Findings:  You were provided with: a psychiatric assessment, assessed for medical stability, medication evaluation and/or management, group therapy and milieu management    Symptoms to Report: feeling more aggressive, increased confusion, losing more sleep, mood getting worse or thoughts of suicide    Early warning signs can include: increased depression or anxiety sleep disturbances increased thoughts or behaviors of suicide or self-harm  increased unusual thinking, such as paranoia or hearing voices    Safety and Wellness:  Take all medicines as directed.  Make no changes unless your doctor suggests them. Follow treatment recommendations.   "Refrain from alcohol and non-prescribed drugs.  Ask your support system to help you reduce your access to items that could harm yourself or others. If there is a concern for safety, call 911.    Resources:   Crisis Intervention: 618.249.4232 or 173-261-7894 (TTY: 399.538.2173).  Call anytime for help.  National [place] on Mental Illness (www.[place].radha.org): 288.582.6027 or 528-357-5895.  [place] Association for Children's Mental Health (www.macmh.org): 608.721.9728.  Suicide Awareness Voices of Education (SAVE) (www.save.org): 685-164-WINI (8187)  National Suicide Prevention Line (www.mentalhealthmn.org): 986-417-OBAV (3961)  Mental Health Consumer/Survivor Network of [place] (www.mhcsn.net): 232.419.4258 or 508-087-6770  Mental Health Association of [place] (www.mentalhealth.org): 849.146.3254 or 994-560-2051  Self- Management and Recovery Training., SMART-- Toll free: 706.936.5497  www.[employer].org  [facility] Crisis (COPE) Response - Adult 361 151-7060  [facility] Crisis Response - Adult 905 333-0283  Text 4 Life: txt \"LIFE\" to 20206 for immediate support and crisis intervention  Crisis text line: Text \"[place]\" to 461838. Free, confidential, 24/7.  Crisis Intervention: 144.978.4714 or 592-520-5837. Call anytime for help.   [facility] Health Crisis Team - Child: [phone]    General Medication Instructions:   See your medication sheet(s) for instructions.   Take all medicines as directed.  Make no changes unless your doctor suggests them.   Go to all your doctor visits.  Be sure to have all your required lab tests. This way, your medicines can be refilled on time.  Do not use any drugs not prescribed by your doctor.  Avoid alcohol.    Advance Directives:   Scanned document on file with [facility]? No scanned doc  Is document scanned? Pt states no documents  Honoring Choices Your Rights Handout: Informed and given  Was more information offered? Pt unable to request    The Treatment team has appreciated " the opportunity to work with you. If you have any questions or concerns about your recent admission, you can contact the unit which can receive your call 24 hours a day, 7 days a week. They will be able to get in touch with a Provider if needed. The unit number is [phone].

## 2021-06-25 NOTE — DISCHARGE SUMMARY
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      Per ED Note:  Yamilka Franklin is a 21 year old female who presents to the emergency department for psychiatric evaluation. The history is obtained from the medical record and the patient's mother, Mandy.  Mackenzie is nble to provide additional history as she is sedated with Zyprexa.   Mackenzie was recently  hospitalized at [facility] from 5/31-6/7/2021 for a suspected  substance-induced mood disorder.  She reported using K2 and marijuana and was experiencing auditory hallucinations and hyperexcitability.  At that time she was agitated and had to be placed on a 72-hour hold and treated with Zyprexa for aggitaiton.   She was discharged home with a supply of her medications, including Zyprexa which she was initiated while inpatient.   Her Adderall and Restoril were discontinued.  They noted that a substantial portion of her symptoms seem to be substance induced.  Her mother states that since returning home, she has had continued symptoms of agitation, irregular sleep, and delusional thinking and responding to internal stimuli.  Her mother states that she used  "marijuana and possibly K2 after returning home. She will often make delusional statements and has been found confused on the streets.  Her mother is concerned for her safety. \"      Per DEC Assessment:  \" The patient is a 21-year-old  female brought to the ER by her mother for continued acute merissa and psychosis symptoms.  Historically the patient has been high functioning, living independently and working at a job at [employer].  In the past year she has been using marijuana for the first time which her mother Tricia ([phone]) believes triggered her first episode of merissa and psychosis.  The patient was adopted at the age of [age] from an orphanage in [facility] so she has some history of trauma and attachment issues.  Patient has type 2 diabetes, it is notable for hospital staff to be aware \"she is really disorganized around food but gets really anxious about food.  She should not have carbs restricted because she can manage it with insulin and when she gets fearful or hungry she gets extremely upset.\"  Mandy states that she brought her daughter to the hospital today because she is so disorganized, no longer is managing her diabetes, eating regularly or taking showers, and has tried to go to work 3 times this week before coming home prematurely due to her any inability to function.      The patient presented somewhat lethargic during the assessment but Mandy states that because she was recently given her Zyprexa and \"she has been antsy all day.\"  The patient states \"I think I would be seeing stuff that only I can see sometimes.  It is like my brain is foggy but I remember bits and pieces that are floating in my mind.  Sometimes I feel like I can hear colors and feel the colors like a mood ring.  I think I might hurt somebody but not on purpose, like accidentally, he had just incidentally I might say something or do something that might be disrespectful or rude.\"  The patient's mother states that Mackenzie is delusional and " "believes \"the [employer] car is calling\"  her and that she should moved to [place].  She says she might be a dog or an alien.  A month ago she went missing for 12 hours because she just goes walking in circles.  Mandy states that she needs redirection for basic tasks like eating, tending to her blood sugar, brushing her teeth and showering.  The patient is not fully oriented and becomes minimally functional only when her mother administers her Zyprexa.  The patient was recently hospitalized at [facility] for 6 days but had a resurgence of symptoms again shortly after.  She has history of ADHD, VALERIE, DMDD and unspecified mood disorder and possibly \" bipolar\" according to the mother.  Mother adds \"my son has an eating disorder and the amount of time and attention he was getting was upsetting to her.  So she started using weed.\"  She states that her daughter was previously taking ADHD medication but then began selling it so it was taken away.  During the assessment the patient states \"I had sex for the first time, it was anal, it was consensual, it was with Martin, he has this mena who I was talking for a long time.  It was very consensual.  He is a bit older than me but I do not mind.\"  She is agreeable to voluntary admission to the hospital.\"     Per Patient Interview/Colletral from Mandy (Mom):  Mackenzie was interviewed in her room. She stated that her mom was concerned about her symptoms of \"merissa\" and called the EMS. \"My mom thinks I have bipolar disorder, I think I have it too.\" She had slept only an hour or so three nights on a row past week and \"my mind did not feel tired but my body did the next morning.\" She disclosed that she has been using cannabis, K2 at least three times a week, and last use was last Thursday. She has been struggling with depression since the age of 15 and have been on fluoxetine and Cymbalta for a long time. Her Cymbalta dose was increased last year during the pandemic when her depression gotten worse; " she had difficulty to concentrate and follow through her online classes and felt isolated. Spoke with mom on the phone. She stated that Mackenzie has been irritable, restless, she has been changing her clothes repetitively, walking in circles at home and engaging in high risk sexual behavior. Mackenzie was hospitalized at [facility] at the end May for similar sx and was discharged with olanzapine 10mg at bed time.      She was medically cleared for admission to inpatient psychiatric unit. The risks, benefits, alternatives, and side effects of treatments including no treatment have been discussed and are understood by the patient and other caregivers.    See Admission note by Jim Rivers MD on 6/22/2021 for additional details.      Objective:     B/P: 96/61, T: 97.7, P: 96, R: 16    Psychiatric Examination:  Appearance:  awake, alert, dressed in hospital scrubs and appeared as age stated  Muscle Strength and Tone: normal  Gait and Station: normal  Behavior (Psychomotor):  no evidence of tardive dyskinesia, dystonia, or tics and fidgeting  Eye Contact:  fair  Speech:  clear, coherent and normal prosody  Mood:  good  Affect:  mood congruent  Attitude:  cooperative  Thought Process:  linear and goal oriented, slightly disorganized   Thought Content:  no evidence of suicidal ideation or homicidal ideation, no evidence of psychotic thought, no auditory hallucinations present and no visual hallucinations present  Associations:  no loose associations  Insight:  limited  Judgment:  limited  Oriented to:  time, person, and place  Attention Span and Concentration: poor, distracted  Recent and Remote Memory:  fair  Language: Fluent in English with appropriate syntax and vocabulary.  Fund of Knowledge: appropriate        Evaluation and Hospital Course:     Diagnostic Impression:  Yamilka Franklin is a 21 year old female with a previously documented history of ADHD, mood disorder (depression), DMT2 and hx of ambiguous genitalia at  birth (s/p  laparotomy in 2013, with removal of streak gonads) who was brought to the ED by her mother on  06/23/2021 with merissa symptoms and psychosis in the context of substance use (cannabis, K2). Significant symptoms include diminished need for sleep,  mood lability, sleep issues, disorganization and substance use.      Her last psychiatric hospitalization was in 05/31/2021 at [facility] for psychosis in the context of substance use. Current psychosocial stressors include school issues and family dynamics (patient has a brother with eating disorder and mom's focus on him seems to be difficult to accept for her) which she has been coping with by using substances, acting out to self and running (patient was missing for 12 hours recently).  Patient's support system includes family.  Substance use does appear to be playing a contributing role in the patient's presentation (cannabis, K2 use). The genetic loading is unknown, patient was adopted. 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  She denies SI or HI. Her reported symptoms of diminished need for sleep, disorganized thoughts, hyperactivity, delusional thinking, risky sexual behavior, hearing and seeing things are consistent with diagnoses of substance-induced psychosis and merissa vs primary mood disorder, possibly bipolar disorder. Patient has a history of depression since age 15, however has never had merissa symptoms before.  At this point, it is unknown if this is first episode of merissa following several years of depression in the context of bipolar disorder, or substance-induced mood disorder.  Per chart review, she was hospitalized at [facility] for mental health two weeks ago where she was started on Zyprexa but was thought to have a substance induced mood and psychotic disorder.     Optimization of medications to target these symptom clusters in addition to evaluation  "of adquate outpatient supports will be targets for treatment during this admission.       Hospital Course:   Mackenzie was admitted to [facility] with Attending Physician Jim Gonzalez.  DULoxetine (CYMBALTA) DR capsule 90 mg   and FLUoxetine (PROzac) capsule 40 mg daily discontinue to help her clear the merissa sx. Lithium 150mg at bedtime started nn 6/24 as a mood stabilizer. She was informed of side effects of lithium. No immediat lithium level check is required at this low dose.   Patient was encouraged to discuss how to manage and [facility]. Although, she recognizes cannabis as a contributor to her manic symptoms, she was unwilling to stop using it. \"I'll see how my body adjust to my medication, and if I am stable for a month, I may use cannabis to give myself a reward.\" Patient's merissa symptoms improved during her short hospital stay. She was discharged to home.     Medical Course:  Patient was medically cleared for admission to the unit. For DM management, we continued insulin per her home regimen as follows:               - Tresiba 27 units at bedtime               - Novolog carb coverage: 1 unit per every 2 grams CHO with meals and snacks               - Novolog sliding scale: 3 units for every 50 >150   - Victoza 1.2 mg at bedtime   - BG checks TID ac, at bedtime, 0200   - Hypoglycemia protocol    We also continued Estradiol PO 4mg daily (hx of exploratory laparotomy in 2013, with removal of streak gonads. Both Wolffian and Mullerian structures were identified. Previous MRI had not shown presence of uterus, vagina or gonads. This was confirmed at laparotomy with the exception of what is noted above. See Dr. Reyez's note of 1/29/16 for details. She then had onset of HRT as noted above and vaginoplasty this past summer. Identifies as female gender identity.)    Consults:   Medicine consult 6/23.    Risk Assessment:   Yamilka Tierney Read has notable " risk factors for self-harm, including single status, psychosis, substance abuse and comorbid medical condition of DMT2. However, risk is mitigated by commitment to family, absence of past attempts, ability to volunteer a safety plan and history of seeking help when needed. Therefore, based on all available evidence including the factors cited above, Yamilka Franklin does not appear to be an imminent danger to self or others and is appropriate for outpatient level of care. However, if patient uses substances or is non-adherent with medication, their risk of decompensation and SI/HI will be elevated. This was discussed with the patient.    This document serves as a transfer of care to Yamilka Franklin's outpatient providers.     Diagnoses:     # Unspecified mood disorder, bipolar disorder vs substance-induced mood disorder   # Unspecified psychosis    # ADHD     Discharge Plan:     Patient is being discharged to home with the following medications and appointments as detailed below:    Current Discharge Medication List      START taking these medications    Details   lithium (ESKALITH) 150 MG capsule Take 1 capsule (150 mg) by mouth At Bedtime  Qty: 30 capsule, Refills: 0    Associated Diagnoses: Bipolar affective disorder, remission status unspecified (H)         CONTINUE these medications which have NOT CHANGED    Details   atomoxetine (STRATTERA) 40 MG capsule Take 40 mg by mouth daily as needed       ergocalciferol (ERGOCALCIFEROL) 1.25 MG (40301 UT) capsule Take 50,000 Units by mouth once a week       estradiol (ESTRACE) 2 MG tablet Take 4 mg by mouth daily       gabapentin (NEURONTIN) 100 MG capsule Take 200 mg by mouth 2 times daily as needed for anxiety      GLUCAGON EMERGENCY 1 MG kit INJECT 1 MG SUBCUTANEOUSLY ONCE FOR 1 DOSE      insulin aspart (NOVOLOG FLEXPEN) 100 UNIT/ML pen Inject 3 times daily with meals. 1 Unit for every 2 grams of carb. 3 Units for every 50 above 150 mg/dL      insulin  degludec (TRESIBA FLEXTOUCH) 100 UNIT/ML pen Inject 26-30 Units Subcutaneous daily       liraglutide (VICTOZA PEN) 18 MG/3ML solution Inject 1.2 mg Subcutaneous daily       lisinopril (ZESTRIL) 20 MG tablet Take 20 mg by mouth daily       OLANZapine (ZYPREXA) 10 MG tablet Take 10 mg by mouth every evening      LORazepam (ATIVAN) 1 MG tablet Take 1 tablet PO 2 hours prior to MRI  Qty: 1 tablet, Refills: 0    Associated Diagnoses: Claustrophobia         STOP taking these medications       DULoxetine (CYMBALTA) 30 MG capsule Comments:   Reason for Stopping:         FLUoxetine (PROZAC) 40 MG capsule Comments:   Reason for Stopping:             Medical Diagnoses:    # Diabetes Mellitus, Type 2  # Ambiguous genitalia at birth, per chart  # Bilateral ovariectomy, per surgical hx    Psychotropic Medications:    Discontinued Medications at This Admission:  (to help clear manic sx)  - DULoxetine (CYMBALTA) DR capsule 90 mg     - FLUoxetine (PROzac) capsule 40 mg       Added/Changed:  - Lithium 150 mg at bedtime as a mood stabilizer    Continued:  - Atomoxetine (STRATTERA) capsule 40 mg for ADHD  - Olanzapine  10mg at bedtime for psychosis    Health Care Follow-up:   Appointment Date/Time: 8/27/2021 at 8:00 AM.  [facility]    Psychiatrist: Stacy Mckeon    Phone Number: [phone]    Pt seen and discussed with my attending, Dr. Amelia Nicholson.   Jaime Alonzo MD  PGY-1 Psychiatry Resident    Attestation:       Appendix A: All Labs This Admission:     Results for orders placed or performed during the hospital encounter of 06/22/21   Glucose by meter     Status: Abnormal   Result Value Ref Range    Glucose 288 (H) 70 - 99 mg/dL   Asymptomatic SARS-CoV-2 COVID-19 Virus (Coronavirus) by PCR     Status: None    Specimen: Nasopharyngeal   Result Value Ref Range    SARS-CoV-2 Virus Specimen Source Nasopharyngeal     SARS-CoV-2 PCR Result NEGATIVE     SARS-CoV-2 PCR Comment (Note)    HCG  qualitative urine (UPT)     Status: None   Result Value Ref Range    HCG Qual Urine Negative NEG^Negative   Drug abuse screen 6 urine (chem dep)     Status: None   Result Value Ref Range    Amphetamine Qual Urine Negative NEG^Negative    Barbiturates Qual Urine Negative NEG^Negative    Benzodiazepine Qual Urine Negative NEG^Negative    Cannabinoids Qual Urine Negative NEG^Negative    Cocaine Qual Urine Negative NEG^Negative    Ethanol Qual Urine Negative NEG^Negative    Opiates Qualitative Urine Negative NEG^Negative   Hemoglobin A1c     Status: Abnormal   Result Value Ref Range    Hemoglobin A1C 8.3 (H) 0 - 5.6 %   Glucose by meter     Status: Abnormal   Result Value Ref Range    Glucose 306 (H) 70 - 99 mg/dL   Glucose by meter     Status: Abnormal   Result Value Ref Range    Glucose 268 (H) 70 - 99 mg/dL   Glucose by meter     Status: Abnormal   Result Value Ref Range    Glucose 212 (H) 70 - 99 mg/dL   Glucose by meter     Status: Abnormal   Result Value Ref Range    Glucose 226 (H) 70 - 99 mg/dL   Glucose by meter     Status: Abnormal   Result Value Ref Range    Glucose 271 (H) 70 - 99 mg/dL   CBC with platelets differential     Status: None   Result Value Ref Range    WBC 8.2 4.0 - 11.0 10e9/L    RBC Count 4.77 3.8 - 5.2 10e12/L    Hemoglobin 13.2 11.7 - 15.7 g/dL    Hematocrit 40.9 35.0 - 47.0 %    MCV 86 78 - 100 fl    MCH 27.7 26.5 - 33.0 pg    MCHC 32.3 31.5 - 36.5 g/dL    RDW 12.9 10.0 - 15.0 %    Platelet Count 306 150 - 450 10e9/L    Diff Method Automated Method     % Neutrophils 59.4 %    % Lymphocytes 33.0 %    % Monocytes 6.0 %    % Eosinophils 1.0 %    % Basophils 0.2 %    % Immature Granulocytes 0.4 %    Nucleated RBCs 0 0 /100    Absolute Neutrophil 4.9 1.6 - 8.3 10e9/L    Absolute Lymphocytes 2.7 0.8 - 5.3 10e9/L    Absolute Monocytes 0.5 0.0 - 1.3 10e9/L    Absolute Eosinophils 0.1 0.0 - 0.7 10e9/L    Absolute Basophils 0.0 0.0 - 0.2 10e9/L    Abs Immature Granulocytes 0.0 0 - 0.4 10e9/L     Absolute Nucleated RBC 0.0     RBC Morphology Normal     Platelet Estimate Confirming automated cell count    Comprehensive metabolic panel     Status: Abnormal   Result Value Ref Range    Sodium 133 133 - 144 mmol/L    Potassium 3.9 3.4 - 5.3 mmol/L    Chloride 100 94 - 109 mmol/L    Carbon Dioxide 25 20 - 32 mmol/L    Anion Gap 8 3 - 14 mmol/L    Glucose 171 (H) 70 - 99 mg/dL    Urea Nitrogen 10 7 - 30 mg/dL    Creatinine 0.51 (L) 0.52 - 1.04 mg/dL    GFR Estimate >90 >60 mL/min/[1.73_m2]    GFR Estimate If Black >90 >60 mL/min/[1.73_m2]    Calcium 9.0 8.5 - 10.1 mg/dL    Bilirubin Total 0.3 0.2 - 1.3 mg/dL    Albumin 3.3 (L) 3.4 - 5.0 g/dL    Protein Total 8.3 6.8 - 8.8 g/dL    Alkaline Phosphatase 69 40 - 150 U/L    ALT 57 (H) 0 - 50 U/L    AST 43 0 - 45 U/L   Lipid panel     Status: Abnormal   Result Value Ref Range    Cholesterol 195 <200 mg/dL    Triglycerides 272 (H) <150 mg/dL    HDL Cholesterol 72 >49 mg/dL    LDL Cholesterol Calculated 69 <100 mg/dL    Non HDL Cholesterol 123 <130 mg/dL   TSH with free T4 reflex and/or T3 as indicated     Status: None   Result Value Ref Range    TSH 1.15 0.40 - 4.00 mU/L   Folate     Status: None   Result Value Ref Range    Folate 18.2 >5.4 ng/mL   Vitamin B12     Status: None   Result Value Ref Range    Vitamin B12 467 193 - 986 pg/mL   Glucose by meter     Status: Abnormal   Result Value Ref Range    Glucose 144 (H) 70 - 99 mg/dL   Glucose by meter     Status: Abnormal   Result Value Ref Range    Glucose 175 (H) 70 - 99 mg/dL   Glucose by meter     Status: None   Result Value Ref Range    Glucose Canceled, Test credited 70 - 99 mg/dL   Glucose by meter     Status: Abnormal   Result Value Ref Range    Glucose 249 (H) 70 - 99 mg/dL   Glucose by meter     Status: Abnormal   Result Value Ref Range    Glucose 201 (H) 70 - 99 mg/dL   Glucose by meter     Status: Abnormal   Result Value Ref Range    Glucose 144 (H) 70 - 99 mg/dL   Glucose by meter     Status: Abnormal   Result  "Value Ref Range    Glucose 258 (H) 70 - 99 mg/dL   Glucose by meter     Status: None   Result Value Ref Range    Glucose 84 70 - 99 mg/dL   EKG 12-lead, tracing only     Status: None   Result Value Ref Range    Interpretation ECG Click View Image link to view waveform and result    Internal Medicine Adult IP Consult for [facility] in [facility]: Patient to be seen: Routine within 24 hrs; Call back #: [phone]; DM managment, on insulin, please evaluate for STD testing also.; Consultant may enter orders: Yes; Requesti...     Status: None ()    Narrative    Romelia Cardoza PA-C     6/23/2021 11:57 AM  [facility]  Consult Note - Hospitalist Service     Date of Admission:  6/22/2021  Consult Requested by: Dr. Alonzo  Reason for Consult: Diabetes management    Assessment & Plan   Yamilka Franklin is a 21 year old female with a history of   DM, anxiety, ADHD, who was admitted to inpatient behavioral   health with manic symptoms.    Insulin-dependent diabetes mellitus type 1: Follows with   [facility] End[facility]ology, per their last note on 5/18/21:   \"Although we have labeled her diagnosis as Type 1 because she is   insulin dependent and diagnosed in childhood, her antibodies at   the time of diagnosis were negative.\" Patient reports good   compliance with home regimen. Appetite currently increased from   baseline. BG in 300s currently. Home insulin regimen verified   with patient and also with OP Endocrine notes.    - Will continue insulin per her home regimen as follows:   - Tresiba 27 units at bedtime   - Novolog carb coverage: 1 unit per every 2 grams CHO with meals   and snacks   - Novolog sliding scale: 3 units for every 50 >150   - Victoza 1.2 mg at bedtime   - BG checks TID ac, at bedtime, 0200   - Hypoglycemia protocol    Medicine will follow blood glucose peripherally until stable.   Please do not hesitate to contact if new questions or concerns " "  arise.     PAUL Dixon [facility]  Securely message with the [employer] Web Console (learn more here)  Text page via [employer] Paging/Directory      Risk Factors Present on Admission                   __________________________________________________________________  ____    Chief Complaint   Lissette    History is obtained from the patient    History of Present Illness   Yamilka Franklin is a 21 year old female with a history of   DM, anxiety, ADHD, who was admitted to inpatient behavioral   health with manic symptoms. Medicine consulted for diabetes   management. She reports being diagnosed with diabetes in   childhood. She tells me that she is \"in between\" type 1 and type   2 diabetes. She follows closely with Endocrinology at   [facility] and just had her Tresiba dose increased at last   visit due to rising A1C. She reports good medication compliance.   Currently she is eating more here than she does at home. She is   concerned that she has bipolar disorder and wishes to speak with   a Psychiatrist about this. She has no other medical concerns   today.     Review of Systems   The 10 point Review of Systems is negative other than noted in   the HPI or here.     Past Medical History    I have reviewed this patient's medical history and updated it   with pertinent information if needed.   History reviewed. No pertinent past medical history.    Past Surgical History   I have reviewed this patient's surgical history and updated it   with pertinent information if needed.  Past Surgical History:   Procedure Laterality Date     LAPAROSCOPY DIAGNOSTIC CHILD  11/8/2013    Procedure: LAPAROSCOPY DIAGNOSTIC CHILD;  Diagnostic   Laparoscopy, Bilateral Gonadectomy ;  Surgeon: Leni Lubin MD;  Location:  OR       Social History   I have reviewed this patient's social history and updated it with   pertinent information if needed.  Social History     Tobacco " Use     Smoking status: Current Every Day Smoker     Types: Cigarettes     Smokeless tobacco: Never Used     Tobacco comment: Vapes daily, Smokes 1-2 cigarettes a day.   Substance Use Topics     Alcohol use: No     Drug use: Yes     Types: Marijuana       Family History     No significant family history    Medications   Medications Prior to Admission   Medication Sig Dispense Refill Last Dose     OLANZapine (ZYPREXA) 10 MG tablet Take 10 mg by mouth every   evening        acetaminophen-codeine 300-30 MG per tablet Take 1-2 tablets by   mouth every 4 hours as needed for pain 20 tablet 0      amphetamine-dextroamphetamine (ADDERALL XR) 30 MG 24 hr capsule   Take 30 mg by mouth        atomoxetine (STRATTERA) 40 MG capsule Take 40 mg by mouth daily          clobetasol (TEMOVATE) 0.05 % external ointment CARLOS BID        DULoxetine (CYMBALTA) 30 MG capsule Take 30 mg by mouth        ergocalciferol (ERGOCALCIFEROL) 1.25 MG (73289 UT) capsule Take   50,000 Units by mouth        estradiol (ESTRACE) 2 MG tablet Take 4 mg by mouth        FLUoxetine (PROZAC) 40 MG capsule Take 40 mg by mouth        gabapentin (NEURONTIN) 100 MG capsule Take 200 mg by mouth 2   times daily as needed for anxiety        GLUCAGON EMERGENCY 1 MG kit INJECT 1 MG SUBCUTANEOUSLY ONCE FOR   1 DOSE        insulin aspart (NOVOLOG FLEXPEN) 100 UNIT/ML pen Inject 150   Units Subcutaneous        insulin degludec (TRESIBA FLEXTOUCH) 100 UNIT/ML pen Inject   26-30 Units Subcutaneous        liraglutide (VICTOZA PEN) 18 MG/3ML solution Inject 1.2 mg   Subcutaneous        lisinopril (ZESTRIL) 20 MG tablet Take 20 mg by mouth        LORazepam (ATIVAN) 1 MG tablet Take 1 tablet PO 2 hours prior   to MRI 1 tablet 0        Allergies   No Known Allergies    Physical Exam   Vital Signs: Temp: 98.7  F (37.1  C) Temp src: Oral BP: 123/83   Pulse: 70   Resp: 16 SpO2: 100 % O2 Device: None (Room air)    Weight: 169 lbs 14.4 oz    Constitutional: Awake and alert, in no  apparent distress.   Eyes: Sclera clear, anicteric   Respiratory: Breathing non-labored. CTAB.  Cardiovascular:  RRR, normal S1/S2. No rubs or murmurs.   GI: Non-distended.   Skin:  Good color. No jaundice. No visible rashes, lesions, or   bruising of concern.   Neurologic: Alert and oriented x3. No focal deficits.       Data   ROUTINE IP LABS (Last four results)  No lab results found in last 7 days.  No lab results found in last 7 days.  No lab results found in last 7 days.     Glucose Values Latest Ref Rng & Units 6/22/2021 6/23/2021 6/23/2021   Bedside Glucose (mg/dl )  - -- -- --   GLUCOSE 70 - 99 mg/dL 288(H) 306(H) 268(H)   Some recent data might be hidden

## 2021-06-25 NOTE — PLAN OF CARE
Assessment/Intervention/Current Symptoms and Care Coordination: Met with team. Reviewed patient's chart and progress notes. Writer checked in with patient. She denies any SI/SIB/HI. Writer completed AVS.                Discharge Plan or Goal: Will discharge home on 6/25/2021. Will follow up with outpatient providers.            Barriers to Discharge: Denies any SI/SIB/HI.           Referral Status: Was referred to [facility]. Was referred to the [facility] program            Legal Status: Voluntary

## 2021-06-25 NOTE — PLAN OF CARE
Pt has been visible in the milieu, she is pleasant and presents with a full range affect. Med compliant with no stated or observed side effects. BG of 258 at 0800. She currently denies all mental health symptoms. She is feeling hopeful about discharge and identifies her parents as a strong support network in her life. No other concerns at this time. Pt will discharge to home via cab.

## 2021-06-25 NOTE — PROGRESS NOTES
" 06/24/21 2100   Groups   Details    (Group Psychotherapy)   Number of patients attending the group:  1  Group Length:  3 Hours- 3 1:1 groups today,no other pts attended     Group Therapy Type:  Psychotherapy     Summary of Group / Topics Discussed:        The  Psychotherapy group goal is to promote insight to positive choice and change. Group processing is within a supportive and safe environment. Patients will process emotions using verbal group and expressive psychotherapy interventions including visual art/writing interventions.      Interventions support patients by: communication/social skills and supports, emotional regulation and mental health management     Modalities to reach these goals include: Narrative psychology/ Art Therpay with the use of metaphor ( trauma informed)     Subjective -patient report of mood today- \"looking forward to discharge tomorrow\"     Objective/ Intervention- Goal of sessions-Therapeutic modality utilized- discussion about coping skills A-Z, watercolor painting, process 1:1      Group Response- very engaged     Patient Response-Pt is very engaged in watercolor painting. She did a lot of therapeutic work today as she was the only person in group attendance for all three groups today . Writer and she created a comprehensive list of coping skills A-Z. She made a piece of art about pride month. This evening she disclosed more about her history. Writer and she talked about father/ mother/ brother. She was adopted by a female and transgender male mother and father, when she was 4 years old from [place].  She spoke about ambivalence about wanting to meet her biological parents, she said she has hurt about abandonment and also some hurt about her younger brother . Her feelings regarding that are \" wasn't I enough\"  When younger brother was adopted, which seems to be connected to the abandonment narrative. Writer worked with her on exploring this though art and metaphor.  She did very good " "work using an apple and orange \" fruit\" as the metaphor for her ambivalence. She said deciding to meet bio parents some day could either be \" ripe or rotten\" like fruit. It was quite a powerful metaphor.     She also spoke a lot about her tasha and being raised [employer] and attending [employer] camps. She and writer could connect on this as writer knows the community she is speaking about which built rapport. At the end of the session she spoke about being torn between  \" heart and head\" responses to emotions. Writer asked her what that looked like if those two thoughts were merged , speaking of DBT wise mind but in visual terms. She had an elaborate image in mind,she described as very colorful and beautiful. Writer and she agreed she will paint that in the morning group tomorrow.    Throughout the day she spoke with ambivalence about giving up \" weed\". She believes \" it helps me.\"        Jaden Lau, LMFT, ATR-BC    "

## 2021-06-25 NOTE — PROGRESS NOTES
06/25/21 0854   Patient Belongings   Did you bring any home meds/supplements to the hospital?  No   Patient Belongings locker   Patient Belongings Put in Hospital Secure Location (Security or Locker, etc.) clothing;bracelet   Belongings Search Yes   Clothing Search Yes   Second Staff preparer of belongings     In locker:  Blue jeans  Grey top  Under wear  Grey bra   Bracelets X3

## 2021-06-25 NOTE — PLAN OF CARE
Pt appeared to sleep for 7 hours overnight. No prns or snacks given. No concerns reported or noted.

## 2021-06-26 NOTE — PROGRESS NOTES
" 06/25/21 2100   Groups   Details    (Group Psychotherapy)   Number of patients attending the group:  1  Group Length:  1.5 hour 1:1 session   Group Therapy Type:  Psychotherapy     Summary of Group / Topics Discussed:        The  Psychotherapy group goal is to promote insight to positive choice and change. Group processing is within a supportive and safe environment. Patients will process emotions using verbal group and expressive psychotherapy interventions including visual art/writing interventions.      Interventions support patients by: communication/social skills and supports, emotional regulation and mental health management     Modalities to reach these goals include: Narrative psychology/ coloring sheets     Subjective -patient report of mood today- \"ready to discharge\"     Objective/ Intervention- Goal of session- DBT skill review ACCEPTS/ PLEASE, replacement coping skills for cannibas reviewed     Group Response-  engaged     Patient Response-Pt was distracted with impending discharge. She was coloring and wanting inspirational music. She was singing along with music. She did a lot of processing yesterday and today she seemed tired and in need of distraction. She still speaks a lot about \"weed\" being her top coping skill.  "

## 2025-04-30 ENCOUNTER — TELEPHONE (OUTPATIENT)
Dept: UROLOGY | Facility: CLINIC | Age: 25
End: 2025-04-30
Payer: COMMERCIAL

## 2025-04-30 NOTE — TELEPHONE ENCOUNTER
[facility] Call Center    Phone Message    May a detailed message be left on voicemail: yes     Reason for Call: Other: [facility] Henri[facility]ett [facility] is calling in looking to speak with Dr. Weldon's team about recommendations on the Pt's care. She states that the Pt is going to be looking into a vaginoplasty revision and they are looking to determine what types of imaging they should be ordering. Please call back as soon as possible to discuss.     Action Taken: Message routed to:  [facility]    Travel Screening: Not Applicable     Date of Service:

## 2025-05-01 NOTE — TELEPHONE ENCOUNTER
Reviewed pt chart. Pt is s/p intestinal vaginoplasty on 7/10/19 at [facility]. Pt was born with ambiguous genitalia.     Pt saw Dr Barnett for a clinic visit yesterday. Per their note, pt has a completely stenosed vaginal canal that has had intermittent discharge for some time. Over the weekend, pt had pain in the canal space. With bearing down pt was able to pass a small amount of mucous into the toilet. Her pain dissipated after this. Discussed with Dr Weldon, who said that the patient would need a CT or MRI with IV contrast. Pt has not yet been seen by our clinic.    Called Dr Barnett' team and spoke with PATRICE Rdz. Relayed the above. Explained that the purpose of this imaging would be to determine if there is a mucocele. Explained that pt may need surgery for revision or removal if there is vaginal remnant collecting fluid/mucous behind the area of stenosis. Kajal reported that a few months ago the patient was able to dilate the canal and that at her appointment yesterday, the provider was unable to pass an IV catheter. The patient reports she has not dilated in 2 months. Kajal will discuss with Dr Barnett and let pt's mother know to call and schedule an appointment with Dr. Weldon.

## 2025-05-06 NOTE — TELEPHONE ENCOUNTER
Action May 6, 2025 JTV 1:35 PM    Action Taken CSS called patient. Patient confirmed she has an MR appt scheduled for 05/15/2025. Patient gave VB OK to update medical records.    MEDICAL RECORDS REQUEST   [facility]  Urology Clinic  [address] [place] [address]  PHONE: [phone]  Fax: [phone]        FUTURE VISIT INFORMATION                                                   Yamilka Franklin, : 2000 scheduled for future visit at [facility]    APPOINTMENT INFORMATION:  Date:   Provider:  Andrew Weldon MD  Reason for Visit/Diagnosis: vaginoplasty revision      RECORDS REQUESTED FOR VISIT                                                     NOTES  STATUS/DETAILS   OFFICE NOTE from other specialist  Yes, 2025, 2025, 2025 -- Henrietta Barnett MD  @ [facility].    DISCHARGE SUMMARY from hospital  yes   OPERATIVE REPORT       Media tab yes,  [facility] plastic surgery 2020     intestinal vaginoplasty on 7/10/19      laparotomy 2013     MEDICATION LIST  yes   LABS     URINALYSIS (UA)  yes,    IMAGES pending yes, scheduled for [facility]  05/15/2025 -- MR     2021 -- MR PELVIS     PRE-VISIT CHECKLIST      Joint diagnostic appointment coordinated correctly          (ensure right order & amount of time) Yes   RECORD COLLECTION COMPLETE If no, please explain PENDING

## 2025-06-05 ENCOUNTER — PRE VISIT (OUTPATIENT)
Dept: UROLOGY | Facility: CLINIC | Age: 25
End: 2025-06-05
Payer: COMMERCIAL

## 2025-06-05 NOTE — TELEPHONE ENCOUNTER
Reason for visit: Consult for     Dx/Hx/Sx: vaginoplasty revision    Records/imaging/labs/orders: in epic    At Rooming: Jeison Hair  6/5/2025  1:34 PM

## 2025-06-10 NOTE — PROGRESS NOTES
HPI:  Mackenzie is a 25 year old female who is being seen to discuss possible vaginoplasty revision. Her medical history is significant for intersex state with ambiguous external genitalia, diabetes, ADHD. 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 She identifies as female. She underwent exploratory laparotomy on 11/8/2013 during which no mullerian structures were identified and gonadectomy was performed to remove streak gonads which were suspected to be atrophic testicular tissue. She was started on estradiol therapy to help encourage secondary puberty development. She underwent vaginoplasty using intestinal tissue on 07/10/2019 at [facility] by Dr. Murphy. She notes that she started losing canal width around 6 weeks despite working on dilation. She is now unable to place a dilator to any depth. She notes intermittent mucus discharge from the opening mostly in the mornings.     No issues with voiding or stream. Denies issues with sexual sensation.     MRI shows a patent vaginal canal proximal to the introital stenosis with reasonable depth.     Pmhx:   Insulin-dependent DM2 (last A1c 6.6% per pt)     Surgical history:   Ex lap with gonadectomy of streak gonads  - 2013   Intestinal vaginoplasty - 2019   No other abdominal surgery     Accompanied by her mother    Exam:  There were no vitals taken for this visit.  GENERAL: Healthy, alert and no distress  RESP: Normal work of breathing   ABD: Non-distended   : Labia majora, open appearing urethral meatus, pinpoint opening to vaginal canal with obvious scarring. Urethral meatus is somewhat high and clitoral anatomy is very unclear.  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 This might be a complete circumferential reanastomosis of her patent canal to the introital opening.     This may involve xenograft or local adjacent tissue transfer of perineum or skin grafting or in extreme scenario, [place] flaps.  We discussed that she will likely need to stay in hospital for one day and may discharge with a vaginal stent for a few days before resuming daily dilation. She would need a phillips.    She will continue to have discharge per vagina due to the intestinal vaginoplasty.     We discussed robotic approaches, but I think a transvaginal approach makes the most sense given patent canal on MRI.    Sue Springer MD  Urology Resident, PGY2    Physician Attestation   I, Andrew Weldon MD, saw this patient and agree with the findings and plan of care as documented in the note.    Very complex situation with uncommon complication of rare surgery. We discussed numerous surgical options. She understands that there is risk of injury to nearby organs, wounds or re-stenosis. She also understands that aesthetic outcome may be better or worse depending on scarring.  She also understands she should perform dilation perhaps daily to prevent recurrent stenosis.  She also understands some mucous production will likely continue postop.    Andrew Weldon MD  Reconstructive Urology    60 minutes spent by me on the date of the encounter doing chart review, history and exam, documentation and further activities per the note

## 2025-06-11 ENCOUNTER — OFFICE VISIT (OUTPATIENT)
Dept: UROLOGY | Facility: CLINIC | Age: 25
End: 2025-06-11
Payer: COMMERCIAL

## 2025-06-11 ENCOUNTER — PRE VISIT (OUTPATIENT)
Dept: UROLOGY | Facility: CLINIC | Age: 25
End: 2025-06-11

## 2025-06-11 VITALS
DIASTOLIC BLOOD PRESSURE: 64 MMHG | SYSTOLIC BLOOD PRESSURE: 87 MMHG | BODY MASS INDEX: 29.16 KG/M2 | HEIGHT: 65 IN | OXYGEN SATURATION: 100 % | WEIGHT: 175 LBS | HEART RATE: 84 BPM

## 2025-06-11 DIAGNOSIS — N89.5 VAGINAL STENOSIS: Primary | ICD-10-CM

## 2025-06-11 PROCEDURE — 3078F DIAST BP <80 MM HG: CPT | Performed by: UROLOGY

## 2025-06-11 PROCEDURE — 3074F SYST BP LT 130 MM HG: CPT | Performed by: UROLOGY

## 2025-06-11 PROCEDURE — 99205 OFFICE O/P NEW HI 60 MIN: CPT | Performed by: UROLOGY

## 2025-06-11 RX ORDER — ACETAMINOPHEN 650 MG/1
650 SUPPOSITORY RECTAL ONCE
OUTPATIENT
Start: 2025-06-11

## 2025-06-11 RX ORDER — ACETAMINOPHEN 325 MG/1
975 TABLET ORAL ONCE
OUTPATIENT
Start: 2025-06-11 | End: 2025-06-11

## 2025-06-11 RX ORDER — CEFAZOLIN SODIUM 2 G/50ML
2 SOLUTION INTRAVENOUS SEE ADMIN INSTRUCTIONS
OUTPATIENT
Start: 2025-06-11

## 2025-06-11 RX ORDER — METRONIDAZOLE 500 MG/100ML
500 INJECTION, SOLUTION INTRAVENOUS
OUTPATIENT
Start: 2025-06-11

## 2025-06-11 RX ORDER — CEFAZOLIN SODIUM 2 G/50ML
2 SOLUTION INTRAVENOUS
OUTPATIENT
Start: 2025-06-11

## 2025-06-11 RX ORDER — HEPARIN SODIUM 5000 [USP'U]/.5ML
5000 INJECTION, SOLUTION INTRAVENOUS; SUBCUTANEOUS
OUTPATIENT
Start: 2025-06-11

## 2025-06-11 NOTE — NURSING NOTE
"Chief Complaint   Patient presents with    Consult       Blood pressure (!) 87/64, pulse 84, height 1.651 m (5' 5\"), weight 79.4 kg (175 lb), SpO2 100%. Body mass index is 29.12 kg/m .    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    Deliberate self-cutting    Disruptive mood dysregulation disorder    Intentional drug overdose (H)    Intersex disorder    LFT elevation    Lipohypertrophy    Long term current use of insulin (H)    Type 1 diabetes mellitus with hyperglycemia (H)    Vaginal stenosis    Somnolence    Polysubstance abuse (H)       No Known Allergies    Current Outpatient Medications   Medication Sig Dispense Refill    atomoxetine (STRATTERA) 40 MG capsule Take 40 mg by mouth daily as needed       ergocalciferol (ERGOCALCIFEROL) 1.25 MG (43645 UT) capsule Take 50,000 Units by mouth once a week       estradiol (ESTRACE) 2 MG tablet Take 4 mg by mouth daily       gabapentin (NEURONTIN) 100 MG capsule Take 200 mg by mouth 2 times daily as needed for anxiety      GLUCAGON EMERGENCY 1 MG kit INJECT 1 MG SUBCUTANEOUSLY ONCE FOR 1 DOSE      insulin aspart (NOVOLOG FLEXPEN) 100 UNIT/ML pen Inject 3 times daily with meals. 1 Unit for every 2 grams of carb. 3 Units for every 50 above 150 mg/dL      insulin degludec (TRESIBA FLEXTOUCH) 100 UNIT/ML pen Inject 26-30 Units Subcutaneous daily       liraglutide (VICTOZA PEN) 18 MG/3ML solution Inject 1.2 mg Subcutaneous daily       lisinopril (ZESTRIL) 20 MG tablet Take 20 mg by mouth daily       lithium (ESKALITH) 150 MG capsule Take 1 capsule (150 mg) by mouth At Bedtime 30 capsule 0    LORazepam (ATIVAN) 1 MG tablet Take 1 tablet PO 2 hours prior to MRI 1 tablet 0    OLANZapine (ZYPREXA) 10 MG tablet Take 10 mg by mouth every evening         Social " History     Tobacco Use    Smoking status: Every Day     Types: Cigarettes    Smokeless tobacco: Never    Tobacco comments:     Vapes daily, Smokes 1-2 cigarettes a day.   Substance Use Topics    Alcohol use: No    Drug use: Yes     Types: Marijuana       Alex Crook  6/11/2025  9:33 AM

## 2025-06-11 NOTE — LETTER
6/11/2025       RE: Yamilka Tierney Read  [address]  [facility] [address]     Dear Colleague,    Thank you for referring your patient, Yamilka Tierney Read, to the [facility] [place] [facility]. Please see a copy of my visit note below.    HPI:  Mackenzie is a 25 year old female who is being seen to discuss possible vaginoplasty revision. Her medical history is significant for intersex state with ambiguous external genitalia, diabetes, ADHD. 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 She identifies as female. She underwent exploratory laparotomy on 11/8/2013 during which no mullerian structures were identified and gonadectomy was performed to remove streak gonads which were suspected to be atrophic testicular tissue. She was started on estradiol therapy to help encourage secondary puberty development. She underwent vaginoplasty using intestinal tissue on 07/10/2019 at [facility] by Dr. Murphy. She notes that she started losing canal width around 6 weeks despite working on dilation. She is now unable to place a dilator to any depth. She notes intermittent mucus discharge from the opening mostly in the mornings.     No issues with voiding or stream. Denies issues with sexual sensation.     MRI shows a patent vaginal canal proximal to the introital stenosis with reasonable depth.     Pmhx:   Insulin-dependent DM2 (last A1c 6.6% per pt)     Surgical history:   Ex lap with gonadectomy of streak gonads  - 2013   Intestinal vaginoplasty - 2019   No other abdominal surgery     Accompanied by her mother    Exam:  There were no vitals taken for this visit.  GENERAL: Healthy, alert and no distress  RESP: Normal work of breathing   ABD: Non-distended   : Labia majora, open appearing urethral meatus, pinpoint opening to  vaginal canal with obvious scarring. Urethral meatus is somewhat high and clitoral anatomy is very unclear.  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 This might be a complete circumferential reanastomosis of her patent canal to the introital opening.     This may involve xenograft or local adjacent tissue transfer of perineum or skin grafting or in extreme scenario, [place] flaps.  We discussed that she will likely need to stay in hospital for one day and may discharge with a vaginal stent for a few days before resuming daily dilation. She would need a phillips.    She will continue to have discharge per vagina due to the intestinal vaginoplasty.     We discussed robotic approaches, but I think a transvaginal approach makes the most sense given patent canal on MRI.    Sue Springer MD  Urology Resident, PGY2    Physician Attestation  I, Andrew Weldon MD, saw this patient and agree with the findings and plan of care as documented in the note.    Very complex situation with uncommon complication of rare surgery. We discussed numerous surgical options. She understands that there is risk of injury to nearby organs, wounds or re-stenosis. She also understands that aesthetic outcome may be better or worse depending on scarring.  She also understands she should perform dilation perhaps daily to prevent recurrent stenosis.  She also understands some mucous production will likely continue postop.    Andrew Weldon  MD  Reconstructive Urology    60 minutes spent by me on the date of the encounter doing chart review, history and exam, documentation and further activities per the note          Again, thank you for allowing me to participate in the care of your patient.      Sincerely,    Andrew Weldon MD

## 2025-06-25 ENCOUNTER — TELEPHONE (OUTPATIENT)
Dept: UROLOGY | Facility: CLINIC | Age: 25
End: 2025-06-25
Payer: COMMERCIAL

## 2025-06-26 NOTE — TELEPHONE ENCOUNTER
Pt called back and wanted to know how long she would be in the hospital for and if she would get more vaginal care information after surgery. Let pt know that she will be in the hospital overnight and she would be able to get more information at her post op appt and schedule any follow up appts at that time.     Michelle Fall RNCC Urology  Phone: [phone]